# Patient Record
Sex: MALE | Race: WHITE | NOT HISPANIC OR LATINO | Employment: OTHER | URBAN - NONMETROPOLITAN AREA
[De-identification: names, ages, dates, MRNs, and addresses within clinical notes are randomized per-mention and may not be internally consistent; named-entity substitution may affect disease eponyms.]

---

## 2021-03-03 DIAGNOSIS — Z23 NEED FOR VACCINATION: ICD-10-CM

## 2022-01-14 ENCOUNTER — OFFICE VISIT (OUTPATIENT)
Dept: URGENT CARE | Facility: PHYSICIAN GROUP | Age: 67
End: 2022-01-14
Payer: MEDICARE

## 2022-01-14 VITALS
RESPIRATION RATE: 16 BRPM | TEMPERATURE: 98 F | SYSTOLIC BLOOD PRESSURE: 150 MMHG | OXYGEN SATURATION: 97 % | WEIGHT: 238.2 LBS | DIASTOLIC BLOOD PRESSURE: 96 MMHG | HEIGHT: 73 IN | HEART RATE: 134 BPM | BODY MASS INDEX: 31.57 KG/M2

## 2022-01-14 DIAGNOSIS — J01.00 ACUTE NON-RECURRENT MAXILLARY SINUSITIS: ICD-10-CM

## 2022-01-14 PROCEDURE — 99203 OFFICE O/P NEW LOW 30 MIN: CPT | Performed by: NURSE PRACTITIONER

## 2022-01-14 RX ORDER — AMOXICILLIN AND CLAVULANATE POTASSIUM 875; 125 MG/1; MG/1
1 TABLET, FILM COATED ORAL 2 TIMES DAILY
Qty: 10 TABLET | Refills: 0 | Status: SHIPPED | OUTPATIENT
Start: 2022-01-14 | End: 2022-01-19

## 2022-01-14 ASSESSMENT — ENCOUNTER SYMPTOMS
EYE PAIN: 0
FEVER: 0
SINUS PAIN: 1
CHILLS: 0
MYALGIAS: 0
NAUSEA: 0
SORE THROAT: 0
DIZZINESS: 0
VOMITING: 0
SHORTNESS OF BREATH: 0

## 2022-01-14 NOTE — PROGRESS NOTES
Subjective:   Bryan Onofre is a 66 y.o. male who presents for Sinusitis (X 7-10 days) and Headache (pressure under his eyes)      Sinusitis  This is a new problem. The current episode started 1 to 4 weeks ago. The problem is unchanged. His pain is at a severity of 3/10. The pain is mild. Associated symptoms include congestion. Pertinent negatives include no chills, shortness of breath or sore throat. Past treatments include acetaminophen. The treatment provided no relief.       Review of Systems   Constitutional: Negative for chills and fever.   HENT: Positive for congestion and sinus pain. Negative for sore throat.    Eyes: Negative for pain.   Respiratory: Negative for shortness of breath.    Cardiovascular: Negative for chest pain.   Gastrointestinal: Negative for nausea and vomiting.   Genitourinary: Negative for hematuria.   Musculoskeletal: Negative for myalgias.   Skin: Negative for rash.   Neurological: Negative for dizziness.       Medications:    • albuterol Aers  • amoxicillin-clavulanate Tabs  • aspirin  • atenolol Tabs  • glyBURIDE Tabs  • hydroCHLOROthiazide Tabs  • lansoprazole Cpdr  • levothyroxine Tabs  • metFORMIN Tabs    Allergies: Nkda [no known drug allergy]    Problem List: Bryan Onofre does not have any pertinent problems on file.    Surgical History:  Past Surgical History:   Procedure Laterality Date   • ARELI BY LAPAROSCOPY  12/13/2012    Performed by Stephanie Dennis M.D. at SURGERY [facility]   • KNEE ARTHROPLASTY TOTAL  1/26/2010    Performed by ANGELIC FLORES at SURGERY [facility]   • KNEE ARTHROSCOPY  2005    right   • OTHER      esophogeal ablation x3       Past Social Hx: Bryan Onofre  reports that he has been smoking cigarettes. He has a 60.00 pack-year smoking history. He has never used smokeless tobacco. He reports that he does not drink alcohol and does not use drugs.     Past Family Hx:  Bryan Onofre family history includes Arthritis in his  "mother; Cancer in his brother, father, and mother.     Problem list, medications, and allergies reviewed by myself today in Epic.     Objective:     /96   Pulse (!) 134   Temp 36.7 °C (98 °F) (Temporal)   Resp 16   Ht 1.854 m (6' 1\")   Wt 108 kg (238 lb 3.2 oz)   SpO2 97%   BMI 31.43 kg/m²     Physical Exam  Vitals and nursing note reviewed.   Constitutional:       General: He is not in acute distress.     Appearance: He is well-developed.   HENT:      Head: Normocephalic and atraumatic.      Right Ear: Tympanic membrane and external ear normal.      Left Ear: Tympanic membrane and external ear normal.      Nose: Nose normal.      Right Sinus: No maxillary sinus tenderness or frontal sinus tenderness.      Left Sinus: No maxillary sinus tenderness or frontal sinus tenderness.      Mouth/Throat:      Mouth: Mucous membranes are moist.      Pharynx: Uvula midline. No posterior oropharyngeal erythema.      Tonsils: No tonsillar exudate or tonsillar abscesses.   Eyes:      General:         Right eye: No discharge.         Left eye: No discharge.      Conjunctiva/sclera: Conjunctivae normal.   Cardiovascular:      Rate and Rhythm: Normal rate.   Pulmonary:      Effort: Pulmonary effort is normal. No respiratory distress.      Breath sounds: Normal breath sounds.   Abdominal:      General: There is no distension.   Musculoskeletal:         General: Normal range of motion.   Skin:     General: Skin is warm and dry.   Neurological:      General: No focal deficit present.      Mental Status: He is alert and oriented to person, place, and time. Mental status is at baseline.      Gait: Gait (gait at baseline) normal.   Psychiatric:         Judgment: Judgment normal.         Assessment/Plan:     Diagnosis and associated orders:     1. Acute non-recurrent maxillary sinusitis  amoxicillin-clavulanate (AUGMENTIN) 875-125 MG Tab      Comments/MDM:     • Advised to continue supportive care with Tylenol and/or ibuprofen " for fevers and discomfort. Increased fluids and electrolytes. Declining COVID testing  • Differential diagnosis, natural history, supportive care, and indications for immediate follow-up discussed.                  Please note that this dictation was created using voice recognition software. I have made a reasonable attempt to correct obvious errors, but I expect that there are errors of grammar and possibly content that I did not discover before finalizing the note.    This note was electronically signed by Vernon BAE.

## 2022-02-02 ENCOUNTER — OFFICE VISIT (OUTPATIENT)
Dept: URGENT CARE | Facility: PHYSICIAN GROUP | Age: 67
End: 2022-02-02
Payer: MEDICARE

## 2022-02-02 VITALS
WEIGHT: 245.8 LBS | SYSTOLIC BLOOD PRESSURE: 148 MMHG | HEIGHT: 73 IN | OXYGEN SATURATION: 99 % | HEART RATE: 80 BPM | BODY MASS INDEX: 32.58 KG/M2 | RESPIRATION RATE: 18 BRPM | TEMPERATURE: 97.2 F | DIASTOLIC BLOOD PRESSURE: 88 MMHG

## 2022-02-02 DIAGNOSIS — J34.89 SINUS PAIN: ICD-10-CM

## 2022-02-02 DIAGNOSIS — J32.9 SINUSITIS, UNSPECIFIED CHRONICITY, UNSPECIFIED LOCATION: ICD-10-CM

## 2022-02-02 PROCEDURE — 99213 OFFICE O/P EST LOW 20 MIN: CPT | Performed by: FAMILY MEDICINE

## 2022-02-02 RX ORDER — AMOXICILLIN AND CLAVULANATE POTASSIUM 875; 125 MG/1; MG/1
1 TABLET, FILM COATED ORAL 2 TIMES DAILY
Qty: 14 TABLET | Refills: 0 | Status: SHIPPED | OUTPATIENT
Start: 2022-02-02 | End: 2022-02-09

## 2022-02-02 ASSESSMENT — ENCOUNTER SYMPTOMS
SHORTNESS OF BREATH: 0
COUGH: 0
DIZZINESS: 0
SINUS PRESSURE: 1
FEVER: 0
SORE THROAT: 0
NAUSEA: 0
VOMITING: 0
CHILLS: 0
MYALGIAS: 0
SINUS PAIN: 1

## 2022-02-02 NOTE — PATIENT INSTRUCTIONS
Sinusitis, Adult  Sinusitis is soreness and swelling (inflammation) of your sinuses. Sinuses are hollow spaces in the bones around your face. They are located:  · Around your eyes.  · In the middle of your forehead.  · Behind your nose.  · In your cheekbones.  Your sinuses and nasal passages are lined with a fluid called mucus. Mucus drains out of your sinuses. Swelling can trap mucus in your sinuses. This lets germs (bacteria, virus, or fungus) grow, which leads to infection. Most of the time, this condition is caused by a virus.  What are the causes?  This condition is caused by:  · Allergies.  · Asthma.  · Germs.  · Things that block your nose or sinuses.  · Growths in the nose (nasal polyps).  · Chemicals or irritants in the air.  · Fungus (rare).  What increases the risk?  You are more likely to develop this condition if:  · You have a weak body defense system (immune system).  · You do a lot of swimming or diving.  · You use nasal sprays too much.  · You smoke.  What are the signs or symptoms?  The main symptoms of this condition are pain and a feeling of pressure around the sinuses. Other symptoms include:  · Stuffy nose (congestion).  · Runny nose (drainage).  · Swelling and warmth in the sinuses.  · Headache.  · Toothache.  · A cough that may get worse at night.  · Mucus that collects in the throat or the back of the nose (postnasal drip).  · Being unable to smell and taste.  · Being very tired (fatigue).  · A fever.  · Sore throat.  · Bad breath.  How is this diagnosed?  This condition is diagnosed based on:  · Your symptoms.  · Your medical history.  · A physical exam.  · Tests to find out if your condition is short-term (acute) or long-term (chronic). Your doctor may:  ? Check your nose for growths (polyps).  ? Check your sinuses using a tool that has a light (endoscope).  ? Check for allergies or germs.  ? Do imaging tests, such as an MRI or CT scan.  How is this treated?  Treatment for this condition  depends on the cause and whether it is short-term or long-term.  · If caused by a virus, your symptoms should go away on their own within 10 days. You may be given medicines to relieve symptoms. They include:  ? Medicines that shrink swollen tissue in the nose.  ? Medicines that treat allergies (antihistamines).  ? A spray that treats swelling of the nostrils.   ? Rinses that help get rid of thick mucus in your nose (nasal saline washes).  · If caused by bacteria, your doctor may wait to see if you will get better without treatment. You may be given antibiotic medicine if you have:  ? A very bad infection.  ? A weak body defense system.  · If caused by growths in the nose, you may need to have surgery.  Follow these instructions at home:  Medicines  · Take, use, or apply over-the-counter and prescription medicines only as told by your doctor. These may include nasal sprays.  · If you were prescribed an antibiotic medicine, take it as told by your doctor. Do not stop taking the antibiotic even if you start to feel better.  Hydrate and humidify    · Drink enough water to keep your pee (urine) pale yellow.  · Use a cool mist humidifier to keep the humidity level in your home above 50%.  · Breathe in steam for 10-15 minutes, 3-4 times a day, or as told by your doctor. You can do this in the bathroom while a hot shower is running.  · Try not to spend time in cool or dry air.  Rest  · Rest as much as you can.  · Sleep with your head raised (elevated).  · Make sure you get enough sleep each night.  General instructions    · Put a warm, moist washcloth on your face 3-4 times a day, or as often as told by your doctor. This will help with discomfort.  · Wash your hands often with soap and water. If there is no soap and water, use hand .  · Do not smoke. Avoid being around people who are smoking (secondhand smoke).  · Keep all follow-up visits as told by your doctor. This is important.  Contact a doctor if:  · You  have a fever.  · Your symptoms get worse.  · Your symptoms do not get better within 10 days.  Get help right away if:  · You have a very bad headache.  · You cannot stop throwing up (vomiting).  · You have very bad pain or swelling around your face or eyes.  · You have trouble seeing.  · You feel confused.  · Your neck is stiff.  · You have trouble breathing.  Summary  · Sinusitis is swelling of your sinuses. Sinuses are hollow spaces in the bones around your face.  · This condition is caused by tissues in your nose that become inflamed or swollen. This traps germs. These can lead to infection.  · If you were prescribed an antibiotic medicine, take it as told by your doctor. Do not stop taking it even if you start to feel better.  · Keep all follow-up visits as told by your doctor. This is important.  This information is not intended to replace advice given to you by your health care provider. Make sure you discuss any questions you have with your health care provider.  Document Released: 06/05/2009 Document Revised: 05/20/2019 Document Reviewed: 05/20/2019  [employer] Patient Education © 2020 [employer]

## 2022-02-02 NOTE — PROGRESS NOTES
Subjective:   Bryan Onofre is a 66 y.o. male who presents for Other (sinus infection x 1 m.o )        Sinusitis  This is a recurrent (Complains of sinus pain and pressure over the past month) problem. The problem is unchanged. Associated symptoms include congestion and sinus pressure. Pertinent negatives include no chills, coughing, shortness of breath or sore throat. (Initial improvement after completing antibiotics and symptoms recurred) Treatments tried: Recent antibiotics. The treatment provided mild relief.     PMH:  has a past medical history of Anesthesia, Arthritis, Vallecillo esophagus, Breath shortness, COPD, Diabetes (12/30/09), Diabetic neuropathy (HCC), Elevated LFT's, GERD (gastroesophageal reflux disease), Heart burn, Hiatus hernia syndrome, Hyperlipidemia, Hypertension, Indigestion, Pain, Sleep apnea, and Unspecified disorder of thyroid.  MEDS:   Current Outpatient Medications:   •  amoxicillin-clavulanate (AUGMENTIN) 875-125 MG Tab, Take 1 Tablet by mouth 2 times a day for 7 days., Disp: 14 Tablet, Rfl: 0  •  lansoprazole (PREVACID) 30 MG CAPSULE DELAYED RELEASE, Take 30 mg by mouth 2 times a day., Disp: , Rfl:   •  levothyroxine (SYNTHROID) 50 MCG TABS, Take 75 mcg by mouth every day., Disp: , Rfl:   •  glyBURIDE (DIABETA) 5 MG TABS, Take 5 mg by mouth 2 Times a Day., Disp: , Rfl:   •  hydrochlorothiazide (HYDRODIURIL) 25 MG TABS, Take 25 mg by mouth every day., Disp: , Rfl:   •  atenolol (TENORMIN) 25 MG TABS, Take 25 mg by mouth every day.  , Disp: , Rfl:   •  metformin (GLUCOPHAGE) 500 MG TABS, Take 500 mg by mouth 3 times a day, with meals., Disp: , Rfl:   •  aspirin 81 MG tablet, Take 81 mg by mouth every day., Disp: , Rfl:   •  albuterol (PROVENTIL) 90 MCG/ACT AERS, Inhale 2 Puffs by mouth every 6 hours as needed for Shortness of Breath., Disp: , Rfl:   ALLERGIES:   Allergies   Allergen Reactions   • Nkda [No Known Drug Allergy]      SURGHX:   Past Surgical History:   Procedure Laterality  "Date   • ARELI BY LAPAROSCOPY  12/13/2012    Performed by Stephanie Dennis M.D. at SURGERY [facility]   • KNEE ARTHROPLASTY TOTAL  1/26/2010    Performed by ANGELIC FLORES at SURGERY [facility]   • KNEE ARTHROSCOPY  2005    right   • OTHER      esophogeal ablation x3     SOCHX:  reports that he has been smoking cigarettes. He has a 60.00 pack-year smoking history. He has never used smokeless tobacco. He reports that he does not drink alcohol and does not use drugs.  FH:   Family History   Problem Relation Age of Onset   • Cancer Mother         colon    • Arthritis Mother    • Cancer Father         colon    • Cancer Brother         colon      Review of Systems   Constitutional: Negative for chills and fever.   HENT: Positive for congestion, sinus pressure and sinus pain. Negative for sore throat.    Respiratory: Negative for cough and shortness of breath.    Gastrointestinal: Negative for nausea and vomiting.   Musculoskeletal: Negative for myalgias.   Skin: Negative for rash.   Neurological: Negative for dizziness.        Objective:   /88   Pulse 80   Temp 36.2 °C (97.2 °F) (Temporal)   Resp 18   Ht 1.854 m (6' 1\")   Wt 111 kg (245 lb 12.8 oz)   SpO2 99%   BMI 32.43 kg/m²   Physical Exam  Vitals and nursing note reviewed.   Constitutional:       General: He is not in acute distress.     Appearance: He is well-developed.   HENT:      Head: Normocephalic and atraumatic.      Right Ear: External ear normal.      Left Ear: External ear normal.      Nose: Mucosal edema present.      Right Turbinates: Swollen.      Left Turbinates: Swollen.      Comments: Turbinates edematous, purulent exudate noted     Mouth/Throat:      Mouth: Mucous membranes are moist.      Pharynx: Posterior oropharyngeal erythema (posterior pharyngeal drainage and erythema) present.   Eyes:      Conjunctiva/sclera: Conjunctivae normal.   Cardiovascular:      Rate and Rhythm: Normal rate.   Pulmonary:      Effort: Pulmonary " effort is normal. No respiratory distress.      Breath sounds: Normal breath sounds. No wheezing or rhonchi.   Abdominal:      General: There is no distension.   Musculoskeletal:         General: Normal range of motion.   Skin:     General: Skin is warm and dry.   Neurological:      General: No focal deficit present.      Mental Status: He is alert and oriented to person, place, and time. Mental status is at baseline.      Gait: Gait (gait at baseline) normal.   Psychiatric:         Judgment: Judgment normal.           Assessment/Plan:   1. Sinusitis, unspecified chronicity, unspecified location  - amoxicillin-clavulanate (AUGMENTIN) 875-125 MG Tab; Take 1 Tablet by mouth 2 times a day for 7 days.  Dispense: 14 Tablet; Refill: 0    2. Sinus pain  - amoxicillin-clavulanate (AUGMENTIN) 875-125 MG Tab; Take 1 Tablet by mouth 2 times a day for 7 days.  Dispense: 14 Tablet; Refill: 0        Medical Decision Making/Course:  In the course of preparing for this visit with review of the pertinent past medical history, recent and past clinic visits, current medications, and performing chart, immunization, medical history and medication reconciliation, and in the further course of obtaining the current history pertinent to the clinic visit today, performing an exam and evaluation, ordering and independently evaluating labs, tests  , and/or procedures, prescribing any recommended new medications as noted above, providing any pertinent counseling and education and recommending further coordination of care, at least  15 minutes of total time were spent during this encounter.      Discussed close monitoring, return precautions, and supportive measures of maintaining adequate fluid hydration and caloric intake, relative rest and symptom management as needed for pain and/or fever.    Differential diagnosis, natural history, supportive care, and indications for immediate follow-up discussed.     Advised the patient to follow-up with  the primary care physician for recheck, reevaluation, and consideration of further management.    Please note that this dictation was created using voice recognition software. I have worked with consultants from the vendor as well as technical experts from [facility] [employer] to optimize the interface. I have made every reasonable attempt to correct obvious errors, but I expect that there are errors of grammar and possibly content that I did not discover before finalizing the note.

## 2022-06-20 ENCOUNTER — TELEPHONE (OUTPATIENT)
Dept: SCHEDULING | Facility: IMAGING CENTER | Age: 67
End: 2022-06-20

## 2022-08-18 ENCOUNTER — OFFICE VISIT (OUTPATIENT)
Dept: MEDICAL GROUP | Facility: PHYSICIAN GROUP | Age: 67
End: 2022-08-18
Payer: MEDICARE

## 2022-08-18 ENCOUNTER — HOSPITAL ENCOUNTER (OUTPATIENT)
Dept: LAB | Facility: MEDICAL CENTER | Age: 67
End: 2022-08-18
Attending: FAMILY MEDICINE
Payer: MEDICARE

## 2022-08-18 VITALS
SYSTOLIC BLOOD PRESSURE: 120 MMHG | HEART RATE: 65 BPM | TEMPERATURE: 97.4 F | BODY MASS INDEX: 29.95 KG/M2 | OXYGEN SATURATION: 95 % | HEIGHT: 73 IN | RESPIRATION RATE: 18 BRPM | WEIGHT: 226 LBS | DIASTOLIC BLOOD PRESSURE: 78 MMHG

## 2022-08-18 DIAGNOSIS — E55.9 VITAMIN D DEFICIENCY: ICD-10-CM

## 2022-08-18 DIAGNOSIS — Z12.5 SCREENING FOR MALIGNANT NEOPLASM OF PROSTATE: ICD-10-CM

## 2022-08-18 DIAGNOSIS — E03.9 HYPOTHYROIDISM, UNSPECIFIED TYPE: ICD-10-CM

## 2022-08-18 DIAGNOSIS — R53.82 CHRONIC FATIGUE: ICD-10-CM

## 2022-08-18 DIAGNOSIS — E11.65 UNCONTROLLED TYPE 2 DIABETES MELLITUS WITH HYPERGLYCEMIA (HCC): ICD-10-CM

## 2022-08-18 LAB
ALBUMIN SERPL BCP-MCNC: 4.8 G/DL (ref 3.2–4.9)
ALBUMIN/GLOB SERPL: 1.5 G/DL
ALP SERPL-CCNC: 65 U/L (ref 30–99)
ALT SERPL-CCNC: 11 U/L (ref 2–50)
ANION GAP SERPL CALC-SCNC: 16 MMOL/L (ref 7–16)
AST SERPL-CCNC: 14 U/L (ref 12–45)
BASOPHILS # BLD AUTO: 1.8 % (ref 0–1.8)
BASOPHILS # BLD: 0.1 K/UL (ref 0–0.12)
BILIRUB SERPL-MCNC: 0.6 MG/DL (ref 0.1–1.5)
BUN SERPL-MCNC: 22 MG/DL (ref 8–22)
CALCIUM SERPL-MCNC: 9.6 MG/DL (ref 8.5–10.5)
CHLORIDE SERPL-SCNC: 93 MMOL/L (ref 96–112)
CHOLEST SERPL-MCNC: 252 MG/DL (ref 100–199)
CO2 SERPL-SCNC: 22 MMOL/L (ref 20–33)
CREAT SERPL-MCNC: 0.97 MG/DL (ref 0.5–1.4)
EOSINOPHIL # BLD AUTO: 0.08 K/UL (ref 0–0.51)
EOSINOPHIL NFR BLD: 1.4 % (ref 0–6.9)
ERYTHROCYTE [DISTWIDTH] IN BLOOD BY AUTOMATED COUNT: 42.1 FL (ref 35.9–50)
EST. AVERAGE GLUCOSE BLD GHB EST-MCNC: 341 MG/DL
FASTING STATUS PATIENT QL REPORTED: NORMAL
GFR SERPLBLD CREATININE-BSD FMLA CKD-EPI: 84 ML/MIN/1.73 M 2
GLOBULIN SER CALC-MCNC: 3.3 G/DL (ref 1.9–3.5)
GLUCOSE SERPL-MCNC: 392 MG/DL (ref 65–99)
HBA1C MFR BLD: 13.5 % (ref 4–5.6)
HCT VFR BLD AUTO: 44 % (ref 42–52)
HDLC SERPL-MCNC: 28 MG/DL
HGB BLD-MCNC: 15.7 G/DL (ref 14–18)
IMM GRANULOCYTES # BLD AUTO: 0.03 K/UL (ref 0–0.11)
IMM GRANULOCYTES NFR BLD AUTO: 0.5 % (ref 0–0.9)
LDLC SERPL CALC-MCNC: ABNORMAL MG/DL
LYMPHOCYTES # BLD AUTO: 2.34 K/UL (ref 1–4.8)
LYMPHOCYTES NFR BLD: 41.6 % (ref 22–41)
MCH RBC QN AUTO: 34.1 PG (ref 27–33)
MCHC RBC AUTO-ENTMCNC: 35.7 G/DL (ref 33.7–35.3)
MCV RBC AUTO: 95.7 FL (ref 81.4–97.8)
MONOCYTES # BLD AUTO: 0.36 K/UL (ref 0–0.85)
MONOCYTES NFR BLD AUTO: 6.4 % (ref 0–13.4)
NEUTROPHILS # BLD AUTO: 2.72 K/UL (ref 1.82–7.42)
NEUTROPHILS NFR BLD: 48.3 % (ref 44–72)
NRBC # BLD AUTO: 0 K/UL
NRBC BLD-RTO: 0 /100 WBC
PLATELET # BLD AUTO: 243 K/UL (ref 164–446)
PMV BLD AUTO: 11.4 FL (ref 9–12.9)
POTASSIUM SERPL-SCNC: 4.4 MMOL/L (ref 3.6–5.5)
PROT SERPL-MCNC: 8.1 G/DL (ref 6–8.2)
RBC # BLD AUTO: 4.6 M/UL (ref 4.7–6.1)
SODIUM SERPL-SCNC: 131 MMOL/L (ref 135–145)
TRIGL SERPL-MCNC: 448 MG/DL (ref 0–149)
WBC # BLD AUTO: 5.6 K/UL (ref 4.8–10.8)

## 2022-08-18 PROCEDURE — 85025 COMPLETE CBC W/AUTO DIFF WBC: CPT

## 2022-08-18 PROCEDURE — 99214 OFFICE O/P EST MOD 30 MIN: CPT | Performed by: FAMILY MEDICINE

## 2022-08-18 PROCEDURE — 82570 ASSAY OF URINE CREATININE: CPT

## 2022-08-18 PROCEDURE — 84153 ASSAY OF PSA TOTAL: CPT

## 2022-08-18 PROCEDURE — 83036 HEMOGLOBIN GLYCOSYLATED A1C: CPT

## 2022-08-18 PROCEDURE — 36415 COLL VENOUS BLD VENIPUNCTURE: CPT

## 2022-08-18 PROCEDURE — 80053 COMPREHEN METABOLIC PANEL: CPT

## 2022-08-18 PROCEDURE — 82306 VITAMIN D 25 HYDROXY: CPT

## 2022-08-18 PROCEDURE — 82043 UR ALBUMIN QUANTITATIVE: CPT

## 2022-08-18 PROCEDURE — 84443 ASSAY THYROID STIM HORMONE: CPT

## 2022-08-18 PROCEDURE — 80061 LIPID PANEL: CPT

## 2022-08-18 RX ORDER — DULOXETIN HYDROCHLORIDE 60 MG/1
60 CAPSULE, DELAYED RELEASE ORAL DAILY
Qty: 90 CAPSULE | Refills: 3 | Status: SHIPPED | OUTPATIENT
Start: 2022-08-18 | End: 2023-06-01 | Stop reason: SDUPTHER

## 2022-08-18 RX ORDER — TAMSULOSIN HYDROCHLORIDE 0.4 MG/1
0.4 CAPSULE ORAL
Qty: 90 CAPSULE | Refills: 3 | Status: SHIPPED | OUTPATIENT
Start: 2022-08-18 | End: 2023-06-01 | Stop reason: SDUPTHER

## 2022-08-18 RX ORDER — HYDROCHLOROTHIAZIDE 25 MG/1
25 TABLET ORAL DAILY
Qty: 90 TABLET | Refills: 3 | Status: SHIPPED | OUTPATIENT
Start: 2022-08-18 | End: 2023-06-01 | Stop reason: SDUPTHER

## 2022-08-18 RX ORDER — BUDESONIDE AND FORMOTEROL FUMARATE DIHYDRATE 80; 4.5 UG/1; UG/1
2 AEROSOL RESPIRATORY (INHALATION) 2 TIMES DAILY
COMMUNITY
End: 2022-08-18 | Stop reason: SDUPTHER

## 2022-08-18 RX ORDER — OMEPRAZOLE 20 MG/1
40 CAPSULE, DELAYED RELEASE ORAL DAILY
Qty: 180 CAPSULE | Refills: 3 | Status: SHIPPED | OUTPATIENT
Start: 2022-08-18 | End: 2023-06-01 | Stop reason: SDUPTHER

## 2022-08-18 RX ORDER — INSULIN GLARGINE 100 [IU]/ML
10 INJECTION, SOLUTION SUBCUTANEOUS EVERY EVENING
Qty: 9 ML | Refills: 0 | Status: SHIPPED | OUTPATIENT
Start: 2022-08-18 | End: 2022-11-08 | Stop reason: SDUPTHER

## 2022-08-18 RX ORDER — LEVOTHYROXINE SODIUM 0.05 MG/1
75 TABLET ORAL DAILY
Qty: 135 TABLET | Refills: 3 | Status: SHIPPED | OUTPATIENT
Start: 2022-08-18 | End: 2022-09-22

## 2022-08-18 RX ORDER — DULOXETIN HYDROCHLORIDE 20 MG/1
20 CAPSULE, DELAYED RELEASE ORAL DAILY
COMMUNITY
End: 2022-08-18

## 2022-08-18 RX ORDER — OMEPRAZOLE 20 MG/1
40 CAPSULE, DELAYED RELEASE ORAL DAILY
COMMUNITY
End: 2022-08-18 | Stop reason: SDUPTHER

## 2022-08-18 RX ORDER — TAMSULOSIN HYDROCHLORIDE 0.4 MG/1
0.4 CAPSULE ORAL
COMMUNITY
End: 2022-08-18 | Stop reason: SDUPTHER

## 2022-08-18 RX ORDER — ATENOLOL 25 MG/1
25 TABLET ORAL DAILY
Qty: 90 TABLET | Refills: 3 | Status: SHIPPED | OUTPATIENT
Start: 2022-08-18 | End: 2023-06-01 | Stop reason: SDUPTHER

## 2022-08-18 RX ORDER — GEMFIBROZIL 600 MG/1
600 TABLET, FILM COATED ORAL 2 TIMES DAILY
COMMUNITY
End: 2022-08-18 | Stop reason: SDUPTHER

## 2022-08-18 RX ORDER — BUDESONIDE AND FORMOTEROL FUMARATE DIHYDRATE 80; 4.5 UG/1; UG/1
2 AEROSOL RESPIRATORY (INHALATION) 2 TIMES DAILY
Qty: 30.6 G | Refills: 3 | Status: SHIPPED | OUTPATIENT
Start: 2022-08-18 | End: 2023-06-01 | Stop reason: SDUPTHER

## 2022-08-18 RX ORDER — ASPIRIN 81 MG/1
81 TABLET, CHEWABLE ORAL DAILY
Qty: 100 TABLET | Refills: 3 | Status: SHIPPED | OUTPATIENT
Start: 2022-08-18 | End: 2023-06-01 | Stop reason: SDUPTHER

## 2022-08-18 RX ORDER — GEMFIBROZIL 600 MG/1
600 TABLET, FILM COATED ORAL 2 TIMES DAILY
Qty: 180 TABLET | Refills: 3 | Status: SHIPPED | OUTPATIENT
Start: 2022-08-18 | End: 2022-09-22

## 2022-08-18 ASSESSMENT — PATIENT HEALTH QUESTIONNAIRE - PHQ9
CLINICAL INTERPRETATION OF PHQ2 SCORE: 1
SUM OF ALL RESPONSES TO PHQ QUESTIONS 1-9: 11
5. POOR APPETITE OR OVEREATING: 3 - NEARLY EVERY DAY

## 2022-08-18 NOTE — PROGRESS NOTES
Subjective:   Bryan Onofre is a 67 y.o. male here today for evaluation and management of:     Uncontrolled type 2 diabetes mellitus with hyperglycemia (HCC)  Notes A1c is about 12  Will check POCT  On met, sulfonylurea. Will dc sulfonylurea  He was on januvia had side effects  Did well on victoza in the past with improved blood sugars but due to insurance issues, had to stop  Will provide Rx for lantus and victoza.   Blood sugars night 90s then up to 200s in the morning   He notes he can get A1c down to 7 if he just is on water, broth and popcorn but would like to eat some other foods so recently not been following a diabetic diet. Diet printed for him.   Fasting labs ordered.   Follow up to review labs, retinal etc.     All meds refilled.        Current medicines (including changes today)  Current Outpatient Medications   Medication Sig Dispense Refill    aspirin (ASA) 81 MG Chew Tab chewable tablet Chew 1 Tablet every day. 100 Tablet 3    atenolol (TENORMIN) 25 MG Tab Take 1 Tablet by mouth every day. For high blood pressure 90 Tablet 3    budesonide-formoterol (SYMBICORT) 80-4.5 MCG/ACT Aerosol Inhale 2 Puffs 2 times a day. 30.6 g 3    DULoxetine (CYMBALTA) 60 MG Cap DR Particles delayed-release capsule Take 1 Capsule by mouth every day. For neuropathy pain 90 Capsule 3    gemfibrozil (LOPID) 600 MG Tab Take 1 Tablet by mouth 2 times a day. For high cholesterol 180 Tablet 3    hydroCHLOROthiazide (HYDRODIURIL) 25 MG Tab Take 1 Tablet by mouth every day. For high blood pressure 90 Tablet 3    levothyroxine (SYNTHROID) 50 MCG Tab Take 1.5 Tablets by mouth every day. For hypothyroid 135 Tablet 3    metFORMIN (GLUCOPHAGE) 500 MG Tab Take 1 Tablet by mouth 3 times a day with meals. 270 Tablet 3    omeprazole (PRILOSEC) 20 MG delayed-release capsule Take 2 Capsules by mouth every day. For Vallecillo's esophagus 180 Capsule 3    tamsulosin (FLOMAX) 0.4 MG capsule Take 1 Capsule by mouth 1/2 hour after dinner. For  "prostate 90 Capsule 3    insulin glargine (LANTUS SOLOSTAR) 100 UNIT/ML Solution Pen-injector injection Inject 10 Units under the skin every evening for 90 days. 9 mL 0    liraglutide (VICTOZA) 18 MG/3ML Solution Pen-injector Inject 0.6 mg under the skin every day for 90 days. For diabetes 9 mL 3    albuterol (PROVENTIL) 90 MCG/ACT Aero Soln Inhale 2 Puffs by mouth every 6 hours as needed for Shortness of Breath. (Patient not taking: Reported on 8/18/2022)       No current facility-administered medications for this visit.     He  has a past medical history of Anesthesia, Arthritis, Vallecillo esophagus, Breath shortness, COPD, Diabetes (12/30/09), Diabetic neuropathy (HCC), Elevated LFT's, GERD (gastroesophageal reflux disease), Heart burn, Hiatus hernia syndrome, Hyperlipidemia, Hypertension, Indigestion, Pain, Sleep apnea, and Unspecified disorder of thyroid.    ROS  No chest pain, no shortness of breath, no abdominal pain       Objective:     /78 (BP Location: Left arm, Patient Position: Sitting, BP Cuff Size: Large adult)   Pulse 65   Temp 36.3 °C (97.4 °F)   Resp 18   Ht 1.854 m (6' 1\")   Wt 103 kg (226 lb)   SpO2 95%  Body mass index is 29.82 kg/m².   Physical Exam:  Constitutional: Alert, no distress.  Skin: Warm, dry, good turgor, no rashes in visible areas.  Eye: Equal, round and reactive, conjunctiva clear, lids normal.  ENMT: Lips without lesions, good dentition, oropharynx clear.  Neck: Trachea midline, no masses, no thyromegaly. No cervical or supraclavicular lymphadenopathy  Respiratory: Unlabored respiratory effort, lungs clear to auscultation, no wheezes, no ronchi.  Cardiovascular: Normal S1, S2, no murmur, no edema.  Abdomen: Soft, non-tender, no masses, no hepatosplenomegaly.  Psych: Alert and oriented x3, normal affect and mood.        Assessment and Plan:   The following treatment plan was discussed    1. Uncontrolled type 2 diabetes mellitus with hyperglycemia (HCC)  - HEMOGLOBIN A1C; " Future  - Comp Metabolic Panel; Future  - Lipid Profile; Future  - MICROALBUMIN CREAT RATIO URINE; Future    2. Chronic fatigue  - CBC WITH DIFFERENTIAL; Future    3. Hypothyroidism, unspecified type  - TSH WITH REFLEX TO FT4; Future    4. Vitamin D deficiency  - VITAMIN D,25 HYDROXY; Future    5. Screening for malignant neoplasm of prostate  - PROSTATE SPECIFIC AG SCREENING; Future    Other orders  - aspirin (ASA) 81 MG Chew Tab chewable tablet; Chew 1 Tablet every day.  Dispense: 100 Tablet; Refill: 3  - atenolol (TENORMIN) 25 MG Tab; Take 1 Tablet by mouth every day. For high blood pressure  Dispense: 90 Tablet; Refill: 3  - budesonide-formoterol (SYMBICORT) 80-4.5 MCG/ACT Aerosol; Inhale 2 Puffs 2 times a day.  Dispense: 30.6 g; Refill: 3  - DULoxetine (CYMBALTA) 60 MG Cap DR Particles delayed-release capsule; Take 1 Capsule by mouth every day. For neuropathy pain  Dispense: 90 Capsule; Refill: 3  - gemfibrozil (LOPID) 600 MG Tab; Take 1 Tablet by mouth 2 times a day. For high cholesterol  Dispense: 180 Tablet; Refill: 3  - hydroCHLOROthiazide (HYDRODIURIL) 25 MG Tab; Take 1 Tablet by mouth every day. For high blood pressure  Dispense: 90 Tablet; Refill: 3  - levothyroxine (SYNTHROID) 50 MCG Tab; Take 1.5 Tablets by mouth every day. For hypothyroid  Dispense: 135 Tablet; Refill: 3  - metFORMIN (GLUCOPHAGE) 500 MG Tab; Take 1 Tablet by mouth 3 times a day with meals.  Dispense: 270 Tablet; Refill: 3  - omeprazole (PRILOSEC) 20 MG delayed-release capsule; Take 2 Capsules by mouth every day. For Vallecillo's esophagus  Dispense: 180 Capsule; Refill: 3  - tamsulosin (FLOMAX) 0.4 MG capsule; Take 1 Capsule by mouth 1/2 hour after dinner. For prostate  Dispense: 90 Capsule; Refill: 3  - insulin glargine (LANTUS SOLOSTAR) 100 UNIT/ML Solution Pen-injector injection; Inject 10 Units under the skin every evening for 90 days.  Dispense: 9 mL; Refill: 0  - liraglutide (VICTOZA) 18 MG/3ML Solution Pen-injector; Inject 0.6 mg  under the skin every day for 90 days. For diabetes  Dispense: 9 mL; Refill: 3      Followup: Return in about 1 month (around 9/18/2022) for Lab Review, DM2, fall risk, depression, pls enter phq.

## 2022-08-18 NOTE — PATIENT INSTRUCTIONS
Please get fasting labs, fasting for 8-10 hours before next visit. You can make an appointment for the lab or walk in.   Lab hours [facility] location: Monday to Friday 6 am - 4 pm, Saturday 7 am -noon  Even if you lose your lab paperwork, you can still come in to get your lab done.

## 2022-08-18 NOTE — ASSESSMENT & PLAN NOTE
Notes A1c is about 12  Will check POCT  On met, sulfonylurea. Will dc sulfonylurea  He was on januvia had side effects  Did well on victoza in the past with improved blood sugars but due to insurance issues, had to stop  Will provide Rx for lantus and victoza.   Blood sugars night 90s then up to 200s in the morning   He notes he can get A1c down to 7 if he just is on water, broth and popcorn but would like to eat some other foods so recently not been following a diabetic diet. Diet printed for him.   Fasting labs ordered.   Follow up to review labs, retinal etc.     All meds refilled.

## 2022-08-19 LAB
25(OH)D3 SERPL-MCNC: 70 NG/ML (ref 30–100)
CREAT UR-MCNC: 73.57 MG/DL
MICROALBUMIN UR-MCNC: 1.5 MG/DL
MICROALBUMIN/CREAT UR: 20 MG/G (ref 0–30)
PSA SERPL-MCNC: 0.33 NG/ML (ref 0–4)
TSH SERPL DL<=0.005 MIU/L-ACNC: 2.09 UIU/ML (ref 0.38–5.33)

## 2022-09-07 ENCOUNTER — TELEPHONE (OUTPATIENT)
Dept: MEDICAL GROUP | Facility: PHYSICIAN GROUP | Age: 67
End: 2022-09-07
Payer: MEDICARE

## 2022-09-07 RX ORDER — SEMAGLUTIDE 1.34 MG/ML
0.25 INJECTION, SOLUTION SUBCUTANEOUS
Qty: 1.5 ML | Refills: 3 | Status: SHIPPED | OUTPATIENT
Start: 2022-09-07 | End: 2022-09-22

## 2022-09-07 NOTE — TELEPHONE ENCOUNTER
1. Caller Name: Bryan Onofre                          Call Back Number: [phone] (home) [phone] (work)        How would the patient prefer to be contacted with a response: Hunter read    spoke with the insurance co for the medication Victozza      They state that the patient need to have and adequate rial of any of the following medications    Bydureon, Byetta, ozempic trulicity  before they will consider victozza.

## 2022-09-22 ENCOUNTER — OFFICE VISIT (OUTPATIENT)
Dept: MEDICAL GROUP | Facility: PHYSICIAN GROUP | Age: 67
End: 2022-09-22
Payer: MEDICARE

## 2022-09-22 VITALS
BODY MASS INDEX: 28.63 KG/M2 | SYSTOLIC BLOOD PRESSURE: 126 MMHG | HEIGHT: 73 IN | TEMPERATURE: 97 F | DIASTOLIC BLOOD PRESSURE: 80 MMHG | HEART RATE: 68 BPM | OXYGEN SATURATION: 97 % | WEIGHT: 216 LBS | RESPIRATION RATE: 16 BRPM

## 2022-09-22 DIAGNOSIS — Z12.12 SCREENING FOR COLORECTAL CANCER: ICD-10-CM

## 2022-09-22 DIAGNOSIS — Z12.11 SCREENING FOR COLORECTAL CANCER: ICD-10-CM

## 2022-09-22 DIAGNOSIS — Z12.11 SCREENING FOR COLON CANCER: ICD-10-CM

## 2022-09-22 DIAGNOSIS — E78.5 HYPERLIPIDEMIA, UNSPECIFIED HYPERLIPIDEMIA TYPE: ICD-10-CM

## 2022-09-22 DIAGNOSIS — E11.65 UNCONTROLLED TYPE 2 DIABETES MELLITUS WITH HYPERGLYCEMIA (HCC): ICD-10-CM

## 2022-09-22 DIAGNOSIS — Z80.0 FAMILY HISTORY OF COLON CANCER IN MOTHER: ICD-10-CM

## 2022-09-22 DIAGNOSIS — E03.9 HYPOTHYROIDISM, UNSPECIFIED TYPE: ICD-10-CM

## 2022-09-22 PROCEDURE — 99214 OFFICE O/P EST MOD 30 MIN: CPT | Performed by: FAMILY MEDICINE

## 2022-09-22 RX ORDER — ROSUVASTATIN CALCIUM 10 MG/1
10 TABLET, COATED ORAL EVERY EVENING
Qty: 90 TABLET | Refills: 3 | Status: SHIPPED | OUTPATIENT
Start: 2022-09-22 | End: 2022-09-22 | Stop reason: SDUPTHER

## 2022-09-22 RX ORDER — GABAPENTIN 600 MG/1
TABLET ORAL
COMMUNITY
End: 2022-11-08

## 2022-09-22 RX ORDER — SEMAGLUTIDE 1.34 MG/ML
0.5 INJECTION, SOLUTION SUBCUTANEOUS
Qty: 3 ML | Refills: 0 | Status: SHIPPED | OUTPATIENT
Start: 2022-09-22 | End: 2022-11-08 | Stop reason: SDUPTHER

## 2022-09-22 RX ORDER — LIRAGLUTIDE 6 MG/ML
INJECTION SUBCUTANEOUS
COMMUNITY
End: 2022-09-22

## 2022-09-22 RX ORDER — FENOFIBRATE 145 MG/1
145 TABLET, COATED ORAL DAILY
Qty: 90 TABLET | Refills: 3 | Status: SHIPPED | OUTPATIENT
Start: 2022-09-22 | End: 2022-09-22 | Stop reason: SDUPTHER

## 2022-09-22 RX ORDER — LOSARTAN POTASSIUM 25 MG/1
25 TABLET ORAL DAILY
Qty: 90 TABLET | Refills: 3 | Status: SHIPPED | OUTPATIENT
Start: 2022-09-22 | End: 2022-09-22 | Stop reason: SDUPTHER

## 2022-09-22 RX ORDER — LOSARTAN POTASSIUM 25 MG/1
25 TABLET ORAL DAILY
Qty: 90 TABLET | Refills: 3 | Status: SHIPPED | OUTPATIENT
Start: 2022-09-22 | End: 2023-06-01 | Stop reason: SDUPTHER

## 2022-09-22 RX ORDER — LEVOTHYROXINE SODIUM 0.12 MG/1
125 TABLET ORAL
Qty: 90 TABLET | Refills: 3 | Status: SHIPPED | OUTPATIENT
Start: 2022-09-22 | End: 2023-06-01 | Stop reason: SDUPTHER

## 2022-09-22 RX ORDER — LEVOTHYROXINE SODIUM 0.12 MG/1
125 TABLET ORAL
Qty: 90 TABLET | Refills: 3 | Status: SHIPPED | OUTPATIENT
Start: 2022-09-22 | End: 2022-09-22 | Stop reason: SDUPTHER

## 2022-09-22 RX ORDER — LISINOPRIL 2.5 MG/1
2.5 TABLET ORAL DAILY
Qty: 90 TABLET | Refills: 3 | Status: SHIPPED | OUTPATIENT
Start: 2022-09-22 | End: 2022-09-22

## 2022-09-22 RX ORDER — ROSUVASTATIN CALCIUM 10 MG/1
10 TABLET, COATED ORAL EVERY EVENING
Qty: 90 TABLET | Refills: 3 | Status: SHIPPED | OUTPATIENT
Start: 2022-09-22 | End: 2023-06-01 | Stop reason: SDUPTHER

## 2022-09-22 RX ORDER — FENOFIBRATE 145 MG/1
145 TABLET, COATED ORAL DAILY
Qty: 90 TABLET | Refills: 3 | Status: SHIPPED | OUTPATIENT
Start: 2022-09-22 | End: 2023-06-01 | Stop reason: SDUPTHER

## 2022-09-22 ASSESSMENT — FIBROSIS 4 INDEX: FIB4 SCORE: 1.2

## 2022-09-22 ASSESSMENT — PATIENT HEALTH QUESTIONNAIRE - PHQ9
CLINICAL INTERPRETATION OF PHQ2 SCORE: 3
5. POOR APPETITE OR OVEREATING: 0 - NOT AT ALL
SUM OF ALL RESPONSES TO PHQ QUESTIONS 1-9: 8

## 2022-09-22 NOTE — ASSESSMENT & PLAN NOTE
Severe uncontrolled.   Side effects with januvia  On ozempic x 2 weeks,  Last visit had provided rx for lantus, victoza but A1c increased from 12 to 13.5  Urgent referral to endo and pharmacy provided to help to avoid progressive end organ damage   A1c:   Lab Results   Component Value Date/Time    HBA1C 13.5 (H) 08/18/2022 0853    AVGLUC 341 08/18/2022 0853     Lipids:   Lab Results   Component Value Date/Time    CHOLSTRLTOT 252 (H) 08/18/2022 08:53 AM    TRIGLYCERIDE 448 (H) 08/18/2022 08:53 AM    HDL 28 (A) 08/18/2022 08:53 AM    LDL see below 08/18/2022 08:53 AM   ]  BMP:   Lab Results   Component Value Date/Time    SODIUM 131 (L) 08/18/2022 0853    POTASSIUM 4.4 08/18/2022 0853    CHLORIDE 93 (L) 08/18/2022 0853    CO2 22 08/18/2022 0853    GLUCOSE 392 (H) 08/18/2022 0853    BUN 22 08/18/2022 0853    CREATININE 0.97 08/18/2022 0853    CALCIUM 9.6 08/18/2022 0853    ANION 16.0 08/18/2022 0853     GFR:   Lab Results   Component Value Date/Time    IFAFR[place] >60 08/28/2015 1042    IFNOTAFR >60 08/28/2015 1042     Last eye exam: in clinic today  Foot exam: normal sensation to monofilament testing b/l no callus or ulcers  Diet: not following a diabetic diet as he notes can get A1c down to 7 on broth and popcorn but he likes to eat regular foods.   Advised eating 3 healthy meals, avoid snacking, water for drinking, no meals before 8 am or after 8 pm.     Blood sugars some days down to 110, when he wakes up they are in 300s.   lantus 10 units qhs  He eats twice a day, usually no breakfast sometimes giang and eggs.   He has a big salad for lunch and some meat and vegetables for dinner.   He has managed to lose a lot of weight.   He has a problem with always feeling hungry.   He has taken two shots of ozempic 0.25mg so far. Tolerating this so far.     Stop gemfibrozil  Start crestor, tricor, losartan, cough with lisinopril continue asa

## 2022-09-22 NOTE — ASSESSMENT & PLAN NOTE
Has severe uncontrolled DM2  Is on lopid should be on a statin   Discussed cardiovascular benefits and rx provided.

## 2022-09-23 ENCOUNTER — E-CONSULT (OUTPATIENT)
Dept: ENDOCRINOLOGY | Facility: MEDICAL CENTER | Age: 67
End: 2022-09-23
Payer: MEDICARE

## 2022-09-23 DIAGNOSIS — Z71.9 ENCOUNTER FOR CONSULTATION: ICD-10-CM

## 2022-09-23 PROCEDURE — 99448 NTRPROF PH1/NTRNET/EHR 21-30: CPT | Performed by: INTERNAL MEDICINE

## 2022-09-23 NOTE — PROGRESS NOTES
E-Consult Response     After careful review of the patient's information available in the medical record, the following are my findings and recommendations:    Reason for consult:  recommendations for t2dm patient     Summary of data reviewed: 67 male born on 1955 with Uncontrolled type 2 diabetes  He has hypothyroidism, hyperlipidemia, HTN and is overweight    A1c is 13.5% on 8/18/2022  Prev 5.9% in 2015    Was on MANE    Now on Metformin 500mg tid  Ozempic  0.25mg weekly  Lantus 10u nightly          Recommendations: agree with above meds although if patient could not afford victoza - ozempic may be na issue if he hits medicare gap    Check cpeptide with gen 65 and cmp to check if he is insulin deficient or not  Optimize basal target FBG of  - teach patient how to adjust basal to goal  (increase by 1 unit every other day)  If patient cannot afford ozempic recommend meal time insulin like Lyumjev or Humalog (lyumjev is better) start with 4u QAC and increase accordingly  Have him check his BG 3 times a day before each meal    Recommend referral to endocrinologist or pharmacy diabetes clinic    We have resources to help enroll him in patient assistance programs (PAP) as long as he qualifies        E-Consult Time: 21 minutes were spent with >50% of the total time spent discussing plan of care with requesting physician (Use code [id number])    Bryan Sosa M.D.

## 2022-09-28 NOTE — PROGRESS NOTES
Subjective:   Bryan Onofre is a 67 y.o. male here today for evaluation and management of:     Uncontrolled type 2 diabetes mellitus with hyperglycemia (HCC)  Severe uncontrolled.   Side effects with januvia  On ozempic x 2 weeks,  Last visit had provided rx for lantus, victoza but A1c increased from 12 to 13.5  Urgent referral to endo and pharmacy provided to help to avoid progressive end organ damage   A1c:   Lab Results   Component Value Date/Time    HBA1C 13.5 (H) 08/18/2022 0853    AVGLUC 341 08/18/2022 0853     Lipids:   Lab Results   Component Value Date/Time    CHOLSTRLTOT 252 (H) 08/18/2022 08:53 AM    TRIGLYCERIDE 448 (H) 08/18/2022 08:53 AM    HDL 28 (A) 08/18/2022 08:53 AM    LDL see below 08/18/2022 08:53 AM   ]  BMP:   Lab Results   Component Value Date/Time    SODIUM 131 (L) 08/18/2022 0853    POTASSIUM 4.4 08/18/2022 0853    CHLORIDE 93 (L) 08/18/2022 0853    CO2 22 08/18/2022 0853    GLUCOSE 392 (H) 08/18/2022 0853    BUN 22 08/18/2022 0853    CREATININE 0.97 08/18/2022 0853    CALCIUM 9.6 08/18/2022 0853    ANION 16.0 08/18/2022 0853     GFR:   Lab Results   Component Value Date/Time    IFAFR[place] >60 08/28/2015 1042    IFNOTAFR >60 08/28/2015 1042     Last eye exam: in clinic today  Foot exam: normal sensation to monofilament testing b/l no callus or ulcers  Diet: not following a diabetic diet as he notes can get A1c down to 7 on broth and popcorn but he likes to eat regular foods.   Advised eating 3 healthy meals, avoid snacking, water for drinking, no meals before 8 am or after 8 pm.     Blood sugars some days down to 110, when he wakes up they are in 300s.   lantus 10 units qhs  He eats twice a day, usually no breakfast sometimes giang and eggs.   He has a big salad for lunch and some meat and vegetables for dinner.   He has managed to lose a lot of weight.   He has a problem with always feeling hungry.   He has taken two shots of ozempic 0.25mg so far. Tolerating this so far.     Stop  gemfibrozil  Start crestor, tricor, losartan, cough with lisinopril continue asa        Hyperlipidemia  Has severe uncontrolled DM2  Is on lopid should be on a statin   Discussed cardiovascular benefits and rx provided.              Current medicines (including changes today)  Current Outpatient Medications   Medication Sig Dispense Refill    gabapentin (NEURONTIN) 600 MG tablet GABAPENTIN 600 MG TABS      Semaglutide,0.25 or 0.5MG/DOS, (OZEMPIC, 0.25 OR 0.5 MG/DOSE,) 2 MG/1.5ML Solution Pen-injector Inject 0.5 mg under the skin every 7 days. 3 mL 0    levothyroxine (SYNTHROID) 125 MCG Tab Take 1 Tablet by mouth every morning on an empty stomach. 90 Tablet 3    rosuvastatin (CRESTOR) 10 MG Tab Take 1 Tablet by mouth every evening. 90 Tablet 3    losartan (COZAAR) 25 MG Tab Take 1 Tablet by mouth every day. To protect kidneys 90 Tablet 3    fenofibrate (TRICOR) 145 MG Tab Take 1 Tablet by mouth every day. For high triglycerides 90 Tablet 3    aspirin (ASA) 81 MG Chew Tab chewable tablet Chew 1 Tablet every day. 100 Tablet 3    atenolol (TENORMIN) 25 MG Tab Take 1 Tablet by mouth every day. For high blood pressure 90 Tablet 3    budesonide-formoterol (SYMBICORT) 80-4.5 MCG/ACT Aerosol Inhale 2 Puffs 2 times a day. 30.6 g 3    DULoxetine (CYMBALTA) 60 MG Cap DR Particles delayed-release capsule Take 1 Capsule by mouth every day. For neuropathy pain 90 Capsule 3    hydroCHLOROthiazide (HYDRODIURIL) 25 MG Tab Take 1 Tablet by mouth every day. For high blood pressure 90 Tablet 3    metFORMIN (GLUCOPHAGE) 500 MG Tab Take 1 Tablet by mouth 3 times a day with meals. 270 Tablet 3    omeprazole (PRILOSEC) 20 MG delayed-release capsule Take 2 Capsules by mouth every day. For Vallecillo's esophagus 180 Capsule 3    tamsulosin (FLOMAX) 0.4 MG capsule Take 1 Capsule by mouth 1/2 hour after dinner. For prostate 90 Capsule 3    insulin glargine (LANTUS SOLOSTAR) 100 UNIT/ML Solution Pen-injector injection Inject 10 Units under the skin  "every evening for 90 days. 9 mL 0    albuterol (PROVENTIL) 90 MCG/ACT Aero Soln Inhale 2 Puffs every 6 hours as needed for Shortness of Breath.       No current facility-administered medications for this visit.     He  has a past medical history of Anesthesia, Arthritis, Vallecillo esophagus, Breath shortness, COPD, Diabetes (12/30/09), Diabetic neuropathy (HCC), Elevated LFT's, GERD (gastroesophageal reflux disease), Heart burn, Hiatus hernia syndrome, Hyperlipidemia, Hypertension, Indigestion, Pain, Sleep apnea, and Unspecified disorder of thyroid.    ROS  No chest pain, no shortness of breath, no abdominal pain       Objective:     /80   Pulse 68   Temp 36.1 °C (97 °F) (Temporal)   Resp 16   Ht 1.854 m (6' 1\")   Wt 98 kg (216 lb)   SpO2 97%  Body mass index is 28.5 kg/m².   Physical Exam:  Constitutional: Alert, no distress.  Skin: Warm, dry, good turgor, no rashes in visible areas.  Eye: Equal, round and reactive, conjunctiva clear, lids normal.  ENMT: Lips without lesions, good dentition, oropharynx clear.  Neck: Trachea midline, no masses, no thyromegaly. No cervical or supraclavicular lymphadenopathy  Respiratory: Unlabored respiratory effort, lungs clear to auscultation, no wheezes, no ronchi.  Cardiovascular: Normal S1, S2, no murmur, no edema.  Abdomen: Soft, non-tender, no masses, no hepatosplenomegaly.  Psych: Alert and oriented x3, normal affect and mood.        Assessment and Plan:   The following treatment plan was discussed    1. Screening for colorectal cancer  - Referral to GI for Colonoscopy    2. Uncontrolled type 2 diabetes mellitus with hyperglycemia (HCC)  - POCT Retinal Eye Exam  - E-consult to Endocrinology  - rosuvastatin (CRESTOR) 10 MG Tab; Take 1 Tablet by mouth every evening.  Dispense: 90 Tablet; Refill: 3    3. Hyperlipidemia, unspecified hyperlipidemia type  - rosuvastatin (CRESTOR) 10 MG Tab; Take 1 Tablet by mouth every evening.  Dispense: 90 Tablet; Refill: 3    4. " Hypothyroidism, unspecified type  - TSH WITH REFLEX TO FT4; Future    5. Family history of colon cancer in mother  - Referral to Gastroenterology    6. Screening for colon cancer  - Referral to Gastroenterology    Other orders  - gabapentin (NEURONTIN) 600 MG tablet; GABAPENTIN 600 MG TABS  - Semaglutide,0.25 or 0.5MG/DOS, (OZEMPIC, 0.25 OR 0.5 MG/DOSE,) 2 MG/1.5ML Solution Pen-injector; Inject 0.5 mg under the skin every 7 days.  Dispense: 3 mL; Refill: 0  - levothyroxine (SYNTHROID) 125 MCG Tab; Take 1 Tablet by mouth every morning on an empty stomach.  Dispense: 90 Tablet; Refill: 3  - losartan (COZAAR) 25 MG Tab; Take 1 Tablet by mouth every day. To protect kidneys  Dispense: 90 Tablet; Refill: 3  - fenofibrate (TRICOR) 145 MG Tab; Take 1 Tablet by mouth every day. For high triglycerides  Dispense: 90 Tablet; Refill: 3      Followup: Return in about 1 month (around 10/22/2022) for bring Blood sugar log. 2 mo review blood sugar log, 3 mo DM follow up, review labs.

## 2022-10-26 ENCOUNTER — HOSPITAL ENCOUNTER (OUTPATIENT)
Dept: LAB | Facility: MEDICAL CENTER | Age: 67
End: 2022-10-26
Attending: FAMILY MEDICINE
Payer: MEDICARE

## 2022-10-26 DIAGNOSIS — E03.9 HYPOTHYROIDISM, UNSPECIFIED TYPE: ICD-10-CM

## 2022-10-26 LAB — TSH SERPL DL<=0.005 MIU/L-ACNC: 1.09 UIU/ML (ref 0.38–5.33)

## 2022-10-26 PROCEDURE — 84443 ASSAY THYROID STIM HORMONE: CPT

## 2022-10-26 PROCEDURE — 36415 COLL VENOUS BLD VENIPUNCTURE: CPT

## 2022-11-08 ENCOUNTER — OFFICE VISIT (OUTPATIENT)
Dept: MEDICAL GROUP | Facility: PHYSICIAN GROUP | Age: 67
End: 2022-11-08
Payer: MEDICARE

## 2022-11-08 VITALS
HEIGHT: 73 IN | BODY MASS INDEX: 29.16 KG/M2 | OXYGEN SATURATION: 94 % | HEART RATE: 74 BPM | WEIGHT: 220 LBS | DIASTOLIC BLOOD PRESSURE: 70 MMHG | SYSTOLIC BLOOD PRESSURE: 122 MMHG | RESPIRATION RATE: 14 BRPM | TEMPERATURE: 97.2 F

## 2022-11-08 DIAGNOSIS — E78.5 HYPERLIPIDEMIA, UNSPECIFIED HYPERLIPIDEMIA TYPE: ICD-10-CM

## 2022-11-08 DIAGNOSIS — Z23 NEED FOR VACCINATION: ICD-10-CM

## 2022-11-08 DIAGNOSIS — E11.65 UNCONTROLLED TYPE 2 DIABETES MELLITUS WITH HYPERGLYCEMIA (HCC): ICD-10-CM

## 2022-11-08 DIAGNOSIS — Z13.6 SCREENING FOR AAA (ABDOMINAL AORTIC ANEURYSM): ICD-10-CM

## 2022-11-08 PROBLEM — E11.40 DIABETIC NEUROPATHY (HCC): Status: ACTIVE | Noted: 2022-11-08

## 2022-11-08 PROBLEM — J44.9 CHRONIC OBSTRUCTIVE PULMONARY DISEASE (HCC): Status: ACTIVE | Noted: 2022-11-08

## 2022-11-08 PROCEDURE — 99214 OFFICE O/P EST MOD 30 MIN: CPT | Performed by: FAMILY MEDICINE

## 2022-11-08 RX ORDER — POLYETHYLENE GLYCOL-3350 AND ELECTROLYTES 236; 6.74; 5.86; 2.97; 22.74 G/274.31G; G/274.31G; G/274.31G; G/274.31G; G/274.31G
POWDER, FOR SOLUTION ORAL
COMMUNITY
Start: 2022-10-03 | End: 2023-01-11

## 2022-11-08 RX ORDER — INSULIN GLARGINE 100 [IU]/ML
10 INJECTION, SOLUTION SUBCUTANEOUS EVERY EVENING
Qty: 9 ML | Refills: 6 | Status: SHIPPED | OUTPATIENT
Start: 2022-11-08 | End: 2023-02-06

## 2022-11-08 RX ORDER — CLOSTRIDIUM TETANI TOXOID ANTIGEN (FORMALDEHYDE INACTIVATED), CORYNEBACTERIUM DIPHTHERIAE TOXOID ANTIGEN (FORMALDEHYDE INACTIVATED), BORDETELLA PERTUSSIS TOXOID ANTIGEN (GLUTARALDEHYDE INACTIVATED), BORDETELLA PERTUSSIS FILAMENTOUS HEMAGGLUTININ ANTIGEN (FORMALDEHYDE INACTIVATED), BORDETELLA PERTUSSIS PERTACTIN ANTIGEN, AND BORDETELLA PERTUSSIS FIMBRIAE 2/3 ANTIGEN 5; 2; 2.5; 5; 3; 5 [LF]/.5ML; [LF]/.5ML; UG/.5ML; UG/.5ML; UG/.5ML; UG/.5ML
0.5 INJECTION, SUSPENSION INTRAMUSCULAR ONCE
Qty: 0.5 ML | Refills: 0 | Status: SHIPPED
Start: 2022-11-08 | End: 2022-11-08

## 2022-11-08 RX ORDER — SEMAGLUTIDE 1.34 MG/ML
0.5 INJECTION, SOLUTION SUBCUTANEOUS
Qty: 3 ML | Refills: 6 | Status: SHIPPED | OUTPATIENT
Start: 2022-11-08 | End: 2022-11-14 | Stop reason: SDUPTHER

## 2022-11-08 ASSESSMENT — FIBROSIS 4 INDEX: FIB4 SCORE: 1.2

## 2022-11-08 NOTE — PROGRESS NOTES
Subjective:   Bryan Onofre is a 67 y.o. male here today for evaluation and management of:     Uncontrolled type 2 diabetes mellitus with hyperglycemia (HCC)  Home A1c test was 7.2    A1c:   Lab Results   Component Value Date/Time    HBA1C 13.5 (H) 08/18/2022 0853    AVGLUC 341 08/18/2022 0853     Lipids:   Lab Results   Component Value Date/Time    CHOLSTRLTOT 252 (H) 08/18/2022 08:53 AM    TRIGLYCERIDE 448 (H) 08/18/2022 08:53 AM    HDL 28 (A) 08/18/2022 08:53 AM    LDL see below 08/18/2022 08:53 AM   ]  BMP:   Lab Results   Component Value Date/Time    SODIUM 131 (L) 08/18/2022 0853    POTASSIUM 4.4 08/18/2022 0853    CHLORIDE 93 (L) 08/18/2022 0853    CO2 22 08/18/2022 0853    GLUCOSE 392 (H) 08/18/2022 0853    BUN 22 08/18/2022 0853    CREATININE 0.97 08/18/2022 0853    CALCIUM 9.6 08/18/2022 0853    ANION 16.0 08/18/2022 0853     GFR:   Lab Results   Component Value Date/Time    IFAFR[place] >60 08/28/2015 1042    IFNOTAFR >60 08/28/2015 1042     Last eye exam:   Foot exam: normal sensation to monofilament b/l  Medications: ozempic, lantus     Has colonoscopy and endoscopy scheduled for this Friday.           Current medicines (including changes today)  Current Outpatient Medications   Medication Sig Dispense Refill    tetanus-dipth-acell pertussis (ADACEL) 5-2-15.5 LF-MCG/0.5 Suspension Inject 0.5 mL into the shoulder, thigh, or buttocks one time for 1 dose. 0.5 mL 0    Zoster Vac Recomb Adjuvanted (SHINGRIX) 50 MCG/0.5ML Recon Susp Inject 0.5 mL into the shoulder, thigh, or buttocks one time for 1 dose. 0.5 mL 0    Semaglutide,0.25 or 0.5MG/DOS, (OZEMPIC, 0.25 OR 0.5 MG/DOSE,) 2 MG/1.5ML Solution Pen-injector Inject 0.5 mg under the skin every 7 days. 3 mL 6    insulin glargine (LANTUS SOLOSTAR) 100 UNIT/ML Solution Pen-injector injection Inject 10 Units under the skin every evening for 90 days. 9 mL 6    levothyroxine (SYNTHROID) 125 MCG Tab Take 1 Tablet by mouth every morning on an empty stomach. 90  Tablet 3    rosuvastatin (CRESTOR) 10 MG Tab Take 1 Tablet by mouth every evening. 90 Tablet 3    losartan (COZAAR) 25 MG Tab Take 1 Tablet by mouth every day. To protect kidneys 90 Tablet 3    fenofibrate (TRICOR) 145 MG Tab Take 1 Tablet by mouth every day. For high triglycerides 90 Tablet 3    aspirin (ASA) 81 MG Chew Tab chewable tablet Chew 1 Tablet every day. 100 Tablet 3    atenolol (TENORMIN) 25 MG Tab Take 1 Tablet by mouth every day. For high blood pressure 90 Tablet 3    budesonide-formoterol (SYMBICORT) 80-4.5 MCG/ACT Aerosol Inhale 2 Puffs 2 times a day. 30.6 g 3    DULoxetine (CYMBALTA) 60 MG Cap DR Particles delayed-release capsule Take 1 Capsule by mouth every day. For neuropathy pain 90 Capsule 3    hydroCHLOROthiazide (HYDRODIURIL) 25 MG Tab Take 1 Tablet by mouth every day. For high blood pressure 90 Tablet 3    metFORMIN (GLUCOPHAGE) 500 MG Tab Take 1 Tablet by mouth 3 times a day with meals. 270 Tablet 3    omeprazole (PRILOSEC) 20 MG delayed-release capsule Take 2 Capsules by mouth every day. For Vallecillo's esophagus 180 Capsule 3    tamsulosin (FLOMAX) 0.4 MG capsule Take 1 Capsule by mouth 1/2 hour after dinner. For prostate 90 Capsule 3    albuterol (PROVENTIL) 90 MCG/ACT Aero Soln Inhale 2 Puffs every 6 hours as needed for Shortness of Breath.      Aspirin 162.5 MG CAPSULE SR 24 HR ASPIRIN 81 MG ORAL TABLET      GAVILYTE-G 236 g Recon Soln USE AS DIRECTED      gabapentin (NEURONTIN) 600 MG tablet GABAPENTIN 600 MG TABS       No current facility-administered medications for this visit.     He  has a past medical history of Anesthesia, Arthritis, Vallecillo esophagus, Breath shortness, COPD, Diabetes (12/30/09), Diabetic neuropathy (HCC), Elevated LFT's, GERD (gastroesophageal reflux disease), Heart burn, Hiatus hernia syndrome, Hyperlipidemia, Hypertension, Indigestion, Pain, Sleep apnea, and Unspecified disorder of thyroid.    ROS  No chest pain, no shortness of breath, no abdominal pain        "Objective:     /70 (BP Location: Left arm, Patient Position: Sitting, BP Cuff Size: Adult)   Pulse 74   Temp 36.2 °C (97.2 °F) (Temporal)   Resp 14   Ht 1.854 m (6' 1\")   Wt 99.8 kg (220 lb)   SpO2 94%  Body mass index is 29.03 kg/m².   Physical Exam:  Constitutional: Alert, no distress, well-groomed.  Skin: No rashes in visible areas.  Eye: Round. Conjunctiva clear, lids normal. No icterus.   ENMT: Lips pink without lesions, good dentition, moist mucous membranes. Phonation normal.  Neck: No masses, no thyromegaly. Moves freely without pain.  Respiratory: Unlabored respiratory effort, no cough or audible wheeze  Psych: Alert and oriented x3, normal affect and mood.          Assessment and Plan:   The following treatment plan was discussed    1. Uncontrolled type 2 diabetes mellitus with hyperglycemia (HCC)  - Diabetic Monofilament LE Exam  - Comp Metabolic Panel; Future  - HEMOGLOBIN A1C; Future    2. Need for vaccination  - tetanus-dipth-acell pertussis (ADACEL) 5-2-15.5 LF-MCG/0.5 Suspension; Inject 0.5 mL into the shoulder, thigh, or buttocks one time for 1 dose.  Dispense: 0.5 mL; Refill: 0  - Zoster Vac Recomb Adjuvanted (SHINGRIX) 50 MCG/0.5ML Recon Susp; Inject 0.5 mL into the shoulder, thigh, or buttocks one time for 1 dose.  Dispense: 0.5 mL; Refill: 0    3. Screening for AAA (abdominal aortic aneurysm)  - US-ABDOMINAL AORTA W/O DOPPLER; Future    4. Hyperlipidemia, unspecified hyperlipidemia type  - Lipid Profile; Future    Other orders  - Aspirin 162.5 MG CAPSULE SR 24 HR; ASPIRIN 81 MG ORAL TABLET  - GAVILYTE-G 236 g Recon Soln; USE AS DIRECTED  - Semaglutide,0.25 or 0.5MG/DOS, (OZEMPIC, 0.25 OR 0.5 MG/DOSE,) 2 MG/1.5ML Solution Pen-injector; Inject 0.5 mg under the skin every 7 days.  Dispense: 3 mL; Refill: 6  - insulin glargine (LANTUS SOLOSTAR) 100 UNIT/ML Solution Pen-injector injection; Inject 10 Units under the skin every evening for 90 days.  Dispense: 9 mL; Refill: 6      Followup: " Return for please cancel Dec appt, will see pt in Jan.

## 2022-11-08 NOTE — PATIENT INSTRUCTIONS
Please get fasting labs, fasting for 8-10 hours before next visit. You can make an appointment for the lab or walk in.   Lab hours [facility] location: Monday to Friday 6 am - 4 pm, Saturday 7 am -noon  Even if you lose your lab paperwork, you can still come in to get your lab done.

## 2022-11-08 NOTE — ASSESSMENT & PLAN NOTE
Home A1c test was 7.2    A1c:   Lab Results   Component Value Date/Time    HBA1C 13.5 (H) 08/18/2022 0853    AVGLUC 341 08/18/2022 0853     Lipids:   Lab Results   Component Value Date/Time    CHOLSTRLTOT 252 (H) 08/18/2022 08:53 AM    TRIGLYCERIDE 448 (H) 08/18/2022 08:53 AM    HDL 28 (A) 08/18/2022 08:53 AM    LDL see below 08/18/2022 08:53 AM   ]  BMP:   Lab Results   Component Value Date/Time    SODIUM 131 (L) 08/18/2022 0853    POTASSIUM 4.4 08/18/2022 0853    CHLORIDE 93 (L) 08/18/2022 0853    CO2 22 08/18/2022 0853    GLUCOSE 392 (H) 08/18/2022 0853    BUN 22 08/18/2022 0853    CREATININE 0.97 08/18/2022 0853    CALCIUM 9.6 08/18/2022 0853    ANION 16.0 08/18/2022 0853     GFR:   Lab Results   Component Value Date/Time    IFAFR[place] >60 08/28/2015 1042    IFNOTAFR >60 08/28/2015 1042     Last eye exam:   Foot exam: normal sensation to monofilament b/l  Medications: ozempic, lantus

## 2022-11-09 PROBLEM — I10 HYPERTENSION: Status: ACTIVE | Noted: 2022-11-09

## 2022-11-09 PROBLEM — R80.9 MICROALBUMINURIA: Status: ACTIVE | Noted: 2022-11-09

## 2022-11-09 PROBLEM — E55.9 VITAMIN D DEFICIENCY: Status: ACTIVE | Noted: 2022-11-09

## 2022-11-14 NOTE — TELEPHONE ENCOUNTER
Pt is requesting to have medication be sent to [facility] because insurance is no longer covering Rx through [facility]. Pt was Due 11/14/2022 for dose and was just informed by pharmacy of the insurance issues.

## 2022-11-15 RX ORDER — SEMAGLUTIDE 1.34 MG/ML
0.5 INJECTION, SOLUTION SUBCUTANEOUS
Qty: 3 ML | Refills: 6 | Status: SHIPPED | OUTPATIENT
Start: 2022-11-15

## 2023-01-05 ENCOUNTER — HOSPITAL ENCOUNTER (OUTPATIENT)
Dept: LAB | Facility: MEDICAL CENTER | Age: 68
End: 2023-01-05
Attending: FAMILY MEDICINE
Payer: MEDICARE

## 2023-01-05 DIAGNOSIS — E78.5 HYPERLIPIDEMIA, UNSPECIFIED HYPERLIPIDEMIA TYPE: ICD-10-CM

## 2023-01-05 DIAGNOSIS — E11.65 UNCONTROLLED TYPE 2 DIABETES MELLITUS WITH HYPERGLYCEMIA (HCC): ICD-10-CM

## 2023-01-05 LAB
ALBUMIN SERPL BCP-MCNC: 4.8 G/DL (ref 3.2–4.9)
ALBUMIN/GLOB SERPL: 1.5 G/DL
ALP SERPL-CCNC: 58 U/L (ref 30–99)
ALT SERPL-CCNC: 13 U/L (ref 2–50)
ANION GAP SERPL CALC-SCNC: 10 MMOL/L (ref 7–16)
AST SERPL-CCNC: 16 U/L (ref 12–45)
BILIRUB SERPL-MCNC: 0.4 MG/DL (ref 0.1–1.5)
BUN SERPL-MCNC: 20 MG/DL (ref 8–22)
CALCIUM ALBUM COR SERPL-MCNC: 9.4 MG/DL (ref 8.5–10.5)
CALCIUM SERPL-MCNC: 10 MG/DL (ref 8.5–10.5)
CHLORIDE SERPL-SCNC: 96 MMOL/L (ref 96–112)
CHOLEST SERPL-MCNC: 129 MG/DL (ref 100–199)
CO2 SERPL-SCNC: 26 MMOL/L (ref 20–33)
CREAT SERPL-MCNC: 1.14 MG/DL (ref 0.5–1.4)
EST. AVERAGE GLUCOSE BLD GHB EST-MCNC: 183 MG/DL
FASTING STATUS PATIENT QL REPORTED: NORMAL
GFR SERPLBLD CREATININE-BSD FMLA CKD-EPI: 69 ML/MIN/1.73 M 2
GLOBULIN SER CALC-MCNC: 3.2 G/DL (ref 1.9–3.5)
GLUCOSE SERPL-MCNC: 163 MG/DL (ref 65–99)
HBA1C MFR BLD: 8 % (ref 4–5.6)
HDLC SERPL-MCNC: 24 MG/DL
LDLC SERPL CALC-MCNC: 45 MG/DL
POTASSIUM SERPL-SCNC: 4.8 MMOL/L (ref 3.6–5.5)
PROT SERPL-MCNC: 8 G/DL (ref 6–8.2)
SODIUM SERPL-SCNC: 132 MMOL/L (ref 135–145)
TRIGL SERPL-MCNC: 300 MG/DL (ref 0–149)

## 2023-01-05 PROCEDURE — 36415 COLL VENOUS BLD VENIPUNCTURE: CPT

## 2023-01-05 PROCEDURE — 83036 HEMOGLOBIN GLYCOSYLATED A1C: CPT | Mod: GA

## 2023-01-05 PROCEDURE — 80053 COMPREHEN METABOLIC PANEL: CPT

## 2023-01-05 PROCEDURE — 80061 LIPID PANEL: CPT

## 2023-01-11 ENCOUNTER — OFFICE VISIT (OUTPATIENT)
Dept: MEDICAL GROUP | Facility: PHYSICIAN GROUP | Age: 68
End: 2023-01-11
Payer: MEDICARE

## 2023-01-11 VITALS
HEART RATE: 72 BPM | DIASTOLIC BLOOD PRESSURE: 82 MMHG | OXYGEN SATURATION: 96 % | WEIGHT: 226 LBS | RESPIRATION RATE: 16 BRPM | TEMPERATURE: 97.6 F | HEIGHT: 73 IN | BODY MASS INDEX: 29.95 KG/M2 | SYSTOLIC BLOOD PRESSURE: 124 MMHG

## 2023-01-11 DIAGNOSIS — Z11.1 SCREENING-PULMONARY TB: ICD-10-CM

## 2023-01-11 DIAGNOSIS — E78.5 HYPERLIPIDEMIA, UNSPECIFIED HYPERLIPIDEMIA TYPE: ICD-10-CM

## 2023-01-11 DIAGNOSIS — E11.65 UNCONTROLLED TYPE 2 DIABETES MELLITUS WITH HYPERGLYCEMIA (HCC): ICD-10-CM

## 2023-01-11 DIAGNOSIS — L40.0 PLAQUE PSORIASIS: ICD-10-CM

## 2023-01-11 PROCEDURE — 99214 OFFICE O/P EST MOD 30 MIN: CPT | Performed by: FAMILY MEDICINE

## 2023-01-11 RX ORDER — TACROLIMUS 1 MG/G
OINTMENT TOPICAL
Qty: 100 G | Refills: 3 | Status: SHIPPED | OUTPATIENT
Start: 2023-01-11

## 2023-01-11 RX ORDER — CLOBETASOL PROPIONATE 0.5 MG/G
CREAM TOPICAL
Qty: 60 G | Refills: 11 | Status: SHIPPED | OUTPATIENT
Start: 2023-01-11

## 2023-01-11 ASSESSMENT — FIBROSIS 4 INDEX: FIB4 SCORE: 1.26

## 2023-01-11 ASSESSMENT — PATIENT HEALTH QUESTIONNAIRE - PHQ9: CLINICAL INTERPRETATION OF PHQ2 SCORE: 0

## 2023-01-11 NOTE — PROGRESS NOTES
Subjective:   Bryan Onofre is a 67 y.o. male here today for evaluation and management of:     Uncontrolled type 2 diabetes mellitus with hyperglycemia (HCC)  Sig improvement from 13 down to 8    A1c:   Lab Results   Component Value Date/Time    HBA1C 8.0 (H) 01/05/2023 0741    AVGLUC 183 01/05/2023 0741     Lipids:   Lab Results   Component Value Date/Time    CHOLSTRLTOT 129 01/05/2023 07:41 AM    TRIGLYCERIDE 300 (H) 01/05/2023 07:41 AM    HDL 24 (A) 01/05/2023 07:41 AM    LDL 45 01/05/2023 07:41 AM   ]  BMP:   Lab Results   Component Value Date/Time    SODIUM 132 (L) 01/05/2023 0741    POTASSIUM 4.8 01/05/2023 0741    CHLORIDE 96 01/05/2023 0741    CO2 26 01/05/2023 0741    GLUCOSE 163 (H) 01/05/2023 0741    BUN 20 01/05/2023 0741    CREATININE 1.14 01/05/2023 0741    CALCIUM 10.0 01/05/2023 0741    ANION 10.0 01/05/2023 0741     GFR:   Lab Results   Component Value Date/Time    IFAFR[place] >60 08/28/2015 1042    IFNOTAFR >60 08/28/2015 1042     Last eye exam: advised him to get this scheduled for retinal exam.   Foot exam: reports no foot ulcers.   Medications: metformin, ozempic, asa, rosuvastatin, losartan, fenofibrate, lantus 10 units qhs           He was told by [facility] that one of his pills needed new RX, not sure which one.   He will send me a msg as I don't have anything from [facility] yet.       Current medicines (including changes today)  Current Outpatient Medications   Medication Sig Dispense Refill    Semaglutide,0.25 or 0.5MG/DOS, (OZEMPIC, 0.25 OR 0.5 MG/DOSE,) 2 MG/1.5ML Solution Pen-injector Inject 0.5 mg under the skin every 7 days. 3 mL 6    Aspirin 162.5 MG CAPSULE SR 24 HR ASPIRIN 81 MG ORAL TABLET      insulin glargine (LANTUS SOLOSTAR) 100 UNIT/ML Solution Pen-injector injection Inject 10 Units under the skin every evening for 90 days. 9 mL 6    levothyroxine (SYNTHROID) 125 MCG Tab Take 1 Tablet by mouth every morning on an empty stomach. 90 Tablet 3    rosuvastatin (CRESTOR) 10 MG Tab Take 1  "Tablet by mouth every evening. 90 Tablet 3    losartan (COZAAR) 25 MG Tab Take 1 Tablet by mouth every day. To protect kidneys 90 Tablet 3    fenofibrate (TRICOR) 145 MG Tab Take 1 Tablet by mouth every day. For high triglycerides 90 Tablet 3    aspirin (ASA) 81 MG Chew Tab chewable tablet Chew 1 Tablet every day. 100 Tablet 3    atenolol (TENORMIN) 25 MG Tab Take 1 Tablet by mouth every day. For high blood pressure 90 Tablet 3    budesonide-formoterol (SYMBICORT) 80-4.5 MCG/ACT Aerosol Inhale 2 Puffs 2 times a day. 30.6 g 3    DULoxetine (CYMBALTA) 60 MG Cap DR Particles delayed-release capsule Take 1 Capsule by mouth every day. For neuropathy pain 90 Capsule 3    hydroCHLOROthiazide (HYDRODIURIL) 25 MG Tab Take 1 Tablet by mouth every day. For high blood pressure 90 Tablet 3    metFORMIN (GLUCOPHAGE) 500 MG Tab Take 1 Tablet by mouth 3 times a day with meals. 270 Tablet 3    omeprazole (PRILOSEC) 20 MG delayed-release capsule Take 2 Capsules by mouth every day. For Vallecillo's esophagus 180 Capsule 3    tamsulosin (FLOMAX) 0.4 MG capsule Take 1 Capsule by mouth 1/2 hour after dinner. For prostate 90 Capsule 3    albuterol (PROVENTIL) 90 MCG/ACT Aero Soln Inhale 2 Puffs every 6 hours as needed for Shortness of Breath.      GAVILYTE-G 236 g Recon Soln USE AS DIRECTED       No current facility-administered medications for this visit.     He  has a past medical history of Anesthesia, Arthritis, Vallecillo esophagus, Breath shortness, COPD, Diabetes (12/30/09), Diabetic neuropathy (HCC), Elevated LFT's, GERD (gastroesophageal reflux disease), Heart burn, Hiatus hernia syndrome, Hyperlipidemia, Hypertension, Indigestion, Pain, Sleep apnea, and Unspecified disorder of thyroid.    ROS  No chest pain, no shortness of breath, no abdominal pain       Objective:     /82 (BP Location: Left arm, Patient Position: Sitting, BP Cuff Size: Adult)   Pulse 72   Temp 36.4 °C (97.6 °F) (Temporal)   Resp 16   Ht 1.854 m (6' 1\")   " Wt 103 kg (226 lb)   SpO2 96%  Body mass index is 29.82 kg/m².   Physical Exam:  Constitutional: Alert, no distress.  Skin: Warm, dry, good turgor, no rashes in visible areas.  Eye: Equal, round and reactive, conjunctiva clear, lids normal.  ENMT: Lips without lesions, good dentition, oropharynx clear.  Neck: Trachea midline, no masses, no thyromegaly. No cervical or supraclavicular lymphadenopathy  Respiratory: Unlabored respiratory effort, lungs clear to auscultation, no wheezes, no ronchi.  Cardiovascular: Normal S1, S2, no murmur, no edema.  Abdomen: Soft, non-tender, no masses, no hepatosplenomegaly.  Psych: Alert and oriented x3, normal affect and mood.        Assessment and Plan:   The following treatment plan was discussed    1. Uncontrolled type 2 diabetes mellitus with hyperglycemia (HCC)  - HEMOGLOBIN A1C; Future  - Comp Metabolic Panel; Future    2. Hyperlipidemia, unspecified hyperlipidemia type  - Lipid Profile; Future      Followup: Return in about 3 months (around 4/11/2023) for Diabetes, high triglycerides. retinal exam next visit if not done. .

## 2023-01-11 NOTE — PATIENT INSTRUCTIONS
Please get fasting labs, fasting for 8-10 hours before next visit. You can make an appointment for the lab or walk in.   Lab hours [facility] location: Monday to Friday 6 am - 4 pm, Saturday 7 am -noon  Even if you lose your lab paperwork, you can still come in to get your lab done.     Please check [employer] for your referral information or call the referral department at .   You can also send me a [employer] message regarding your referral information.   Please note you will probably not get a call regarding the referral.

## 2023-01-11 NOTE — ASSESSMENT & PLAN NOTE
Moderate to severe plaque psoriasis was controlled with 3 times a week light therapy. Last therapy was a year ago. When not on light therapy he has flares affecting hair, neck, arms, hands, legs and bottom of his feet. Worse on chest and shoulders with larger areas involved. Eyelids and eyebrows also affected.   Provided high potency steroid cream, encouraged emollient.

## 2023-01-11 NOTE — ASSESSMENT & PLAN NOTE
Sig improvement from 13 down to 8    A1c:   Lab Results   Component Value Date/Time    HBA1C 8.0 (H) 01/05/2023 0741    AVGLUC 183 01/05/2023 0741     Lipids:   Lab Results   Component Value Date/Time    CHOLSTRLTOT 129 01/05/2023 07:41 AM    TRIGLYCERIDE 300 (H) 01/05/2023 07:41 AM    HDL 24 (A) 01/05/2023 07:41 AM    LDL 45 01/05/2023 07:41 AM   ]  BMP:   Lab Results   Component Value Date/Time    SODIUM 132 (L) 01/05/2023 0741    POTASSIUM 4.8 01/05/2023 0741    CHLORIDE 96 01/05/2023 0741    CO2 26 01/05/2023 0741    GLUCOSE 163 (H) 01/05/2023 0741    BUN 20 01/05/2023 0741    CREATININE 1.14 01/05/2023 0741    CALCIUM 10.0 01/05/2023 0741    ANION 10.0 01/05/2023 0741     GFR:   Lab Results   Component Value Date/Time    IF[place] >60 08/28/2015 1042    IFNOTAFR >60 08/28/2015 1042     Last eye exam: advised him to get this scheduled for retinal exam.   Foot exam: reports no foot ulcers.   Medications: metformin, ozempic, asa, rosuvastatin, losartan, fenofibrate, lantus 10 units qhs

## 2023-04-07 ENCOUNTER — HOSPITAL ENCOUNTER (OUTPATIENT)
Dept: LAB | Facility: MEDICAL CENTER | Age: 68
End: 2023-04-07
Attending: FAMILY MEDICINE
Payer: MEDICARE

## 2023-04-07 DIAGNOSIS — E78.5 HYPERLIPIDEMIA, UNSPECIFIED HYPERLIPIDEMIA TYPE: ICD-10-CM

## 2023-04-07 DIAGNOSIS — Z11.1 SCREENING-PULMONARY TB: ICD-10-CM

## 2023-04-07 DIAGNOSIS — E11.65 UNCONTROLLED TYPE 2 DIABETES MELLITUS WITH HYPERGLYCEMIA (HCC): ICD-10-CM

## 2023-04-07 LAB
EST. AVERAGE GLUCOSE BLD GHB EST-MCNC: 192 MG/DL
HBA1C MFR BLD: 8.3 % (ref 4–5.6)

## 2023-04-07 PROCEDURE — 36415 COLL VENOUS BLD VENIPUNCTURE: CPT

## 2023-04-07 PROCEDURE — 80053 COMPREHEN METABOLIC PANEL: CPT

## 2023-04-07 PROCEDURE — 83036 HEMOGLOBIN GLYCOSYLATED A1C: CPT | Mod: GA

## 2023-04-07 PROCEDURE — 80061 LIPID PANEL: CPT

## 2023-04-08 LAB
ALBUMIN SERPL BCP-MCNC: 4.3 G/DL (ref 3.2–4.9)
ALBUMIN/GLOB SERPL: 1.3 G/DL
ALP SERPL-CCNC: 59 U/L (ref 30–99)
ALT SERPL-CCNC: 19 U/L (ref 2–50)
ANION GAP SERPL CALC-SCNC: 14 MMOL/L (ref 7–16)
AST SERPL-CCNC: 16 U/L (ref 12–45)
BILIRUB SERPL-MCNC: 0.5 MG/DL (ref 0.1–1.5)
BUN SERPL-MCNC: 15 MG/DL (ref 8–22)
CALCIUM ALBUM COR SERPL-MCNC: 9.4 MG/DL (ref 8.5–10.5)
CALCIUM SERPL-MCNC: 9.6 MG/DL (ref 8.5–10.5)
CHLORIDE SERPL-SCNC: 101 MMOL/L (ref 96–112)
CHOLEST SERPL-MCNC: 121 MG/DL (ref 100–199)
CO2 SERPL-SCNC: 24 MMOL/L (ref 20–33)
CREAT SERPL-MCNC: 0.9 MG/DL (ref 0.5–1.4)
GFR SERPLBLD CREATININE-BSD FMLA CKD-EPI: 92 ML/MIN/1.73 M 2
GLOBULIN SER CALC-MCNC: 3.2 G/DL (ref 1.9–3.5)
GLUCOSE SERPL-MCNC: 215 MG/DL (ref 65–99)
HDLC SERPL-MCNC: 31 MG/DL
LDLC SERPL CALC-MCNC: 35 MG/DL
POTASSIUM SERPL-SCNC: 4.7 MMOL/L (ref 3.6–5.5)
PROT SERPL-MCNC: 7.5 G/DL (ref 6–8.2)
SODIUM SERPL-SCNC: 139 MMOL/L (ref 135–145)
TRIGL SERPL-MCNC: 274 MG/DL (ref 0–149)

## 2023-04-12 ENCOUNTER — OFFICE VISIT (OUTPATIENT)
Dept: MEDICAL GROUP | Facility: PHYSICIAN GROUP | Age: 68
End: 2023-04-12
Payer: MEDICARE

## 2023-04-12 VITALS
BODY MASS INDEX: 29.82 KG/M2 | TEMPERATURE: 97.9 F | HEART RATE: 73 BPM | HEIGHT: 73 IN | OXYGEN SATURATION: 96 % | RESPIRATION RATE: 14 BRPM | WEIGHT: 225 LBS | SYSTOLIC BLOOD PRESSURE: 136 MMHG | DIASTOLIC BLOOD PRESSURE: 80 MMHG

## 2023-04-12 DIAGNOSIS — J43.9 PULMONARY EMPHYSEMA, UNSPECIFIED EMPHYSEMA TYPE (HCC): ICD-10-CM

## 2023-04-12 DIAGNOSIS — E11.65 UNCONTROLLED TYPE 2 DIABETES MELLITUS WITH HYPERGLYCEMIA (HCC): ICD-10-CM

## 2023-04-12 DIAGNOSIS — Z23 NEED FOR VACCINATION: ICD-10-CM

## 2023-04-12 LAB — RETINAL SCREEN: NORMAL

## 2023-04-12 PROCEDURE — 99214 OFFICE O/P EST MOD 30 MIN: CPT | Performed by: FAMILY MEDICINE

## 2023-04-12 RX ORDER — CLOSTRIDIUM TETANI TOXOID ANTIGEN (FORMALDEHYDE INACTIVATED), CORYNEBACTERIUM DIPHTHERIAE TOXOID ANTIGEN (FORMALDEHYDE INACTIVATED), BORDETELLA PERTUSSIS TOXOID ANTIGEN (GLUTARALDEHYDE INACTIVATED), BORDETELLA PERTUSSIS FILAMENTOUS HEMAGGLUTININ ANTIGEN (FORMALDEHYDE INACTIVATED), BORDETELLA PERTUSSIS PERTACTIN ANTIGEN, AND BORDETELLA PERTUSSIS FIMBRIAE 2/3 ANTIGEN 5; 2; 2.5; 5; 3; 5 [LF]/.5ML; [LF]/.5ML; UG/.5ML; UG/.5ML; UG/.5ML; UG/.5ML
0.5 INJECTION, SUSPENSION INTRAMUSCULAR ONCE
Qty: 0.5 ML | Refills: 0 | Status: SHIPPED
Start: 2023-04-12 | End: 2023-04-12

## 2023-04-12 RX ORDER — INSULIN GLARGINE 100 [IU]/ML
INJECTION, SOLUTION SUBCUTANEOUS EVERY EVENING
COMMUNITY

## 2023-04-12 ASSESSMENT — FIBROSIS 4 INDEX: FIB4 SCORE: 1.06

## 2023-04-12 NOTE — ASSESSMENT & PLAN NOTE
Stable, uncontrolled  He and his wife got covid last month and were isolating at home for 15 days and ate everything at home.     He is on lantus 10 units   Lowest blood sugar 129  Can increase to 15 units/day if blood sugars still >140s after diet changes.   Continue met 1000 bid,   crestor 10   Losartan  Fenofibrate,   ozempic 0.75 mg  A1c:   Lab Results   Component Value Date/Time    HBA1C 8.3 (H) 04/07/2023 0723    AVGLUC 192 04/07/2023 0723     Lipids:   Lab Results   Component Value Date/Time    CHOLSTRLTOT 121 04/07/2023 07:23 AM    TRIGLYCERIDE 274 (H) 04/07/2023 07:23 AM    HDL 31 (A) 04/07/2023 07:23 AM    LDL 35 04/07/2023 07:23 AM   ]  BMP:   Lab Results   Component Value Date/Time    SODIUM 139 04/07/2023 0723    POTASSIUM 4.7 04/07/2023 0723    CHLORIDE 101 04/07/2023 0723    CO2 24 04/07/2023 0723    GLUCOSE 215 (H) 04/07/2023 0723    BUN 15 04/07/2023 0723    CREATININE 0.90 04/07/2023 0723    CALCIUM 9.6 04/07/2023 0723    ANION 14.0 04/07/2023 0723     GFR:   Lab Results   Component Value Date/Time    IF[place] >60 08/28/2015 1042    IFNOTAFR >60 08/28/2015 1042     Last eye exam: unable to get in clinic today. Ref to optometry provided.   Foot exam:   Medications:

## 2023-04-13 NOTE — PROGRESS NOTES
Subjective:   Bryan Onofre is a 68 y.o. male here today for evaluation and management of:     Uncontrolled type 2 diabetes mellitus with hyperglycemia (HCC)  Stable, uncontrolled  He and his wife got covid last month and were isolating at home for 15 days and ate everything at home.     He is on lantus 10 units   Lowest blood sugar 129  Can increase to 15 units/day if blood sugars still >140s after diet changes.   Continue met 1000 bid,   crestor 10   Losartan  Fenofibrate,   ozempic 0.75 mg  A1c:   Lab Results   Component Value Date/Time    HBA1C 8.3 (H) 04/07/2023 0723    AVGLUC 192 04/07/2023 0723     Lipids:   Lab Results   Component Value Date/Time    CHOLSTRLTOT 121 04/07/2023 07:23 AM    TRIGLYCERIDE 274 (H) 04/07/2023 07:23 AM    HDL 31 (A) 04/07/2023 07:23 AM    LDL 35 04/07/2023 07:23 AM   ]  BMP:   Lab Results   Component Value Date/Time    SODIUM 139 04/07/2023 0723    POTASSIUM 4.7 04/07/2023 0723    CHLORIDE 101 04/07/2023 0723    CO2 24 04/07/2023 0723    GLUCOSE 215 (H) 04/07/2023 0723    BUN 15 04/07/2023 0723    CREATININE 0.90 04/07/2023 0723    CALCIUM 9.6 04/07/2023 0723    ANION 14.0 04/07/2023 0723     GFR:   Lab Results   Component Value Date/Time    IF[place] >60 08/28/2015 1042    IFNOTAFR >60 08/28/2015 1042     Last eye exam: unable to get in clinic today. Ref to optometry provided.   Foot exam:   Medications:                Current medicines (including changes today)  Current Outpatient Medications   Medication Sig Dispense Refill    insulin glargine (LANTUS SOLOSTAR) 100 UNIT/ML Solution Pen-injector injection Inject  under the skin every evening.      clobetasol (TEMOVATE) 0.05 % Cream Apply in a thin film to affected skin once a day for plaque psoriasis. 60 g 11    tacrolimus (PROTOPIC) 0.1 % Ointment Apply a thin film to affected skin twice a day for 6-8 week for plaque psoriasis. 100 g 3    Semaglutide,0.25 or 0.5MG/DOS, (OZEMPIC, 0.25 OR 0.5 MG/DOSE,) 2 MG/1.5ML Solution  Pen-injector Inject 0.5 mg under the skin every 7 days. 3 mL 6    Aspirin 162.5 MG CAPSULE SR 24 HR ASPIRIN 81 MG ORAL TABLET      levothyroxine (SYNTHROID) 125 MCG Tab Take 1 Tablet by mouth every morning on an empty stomach. 90 Tablet 3    rosuvastatin (CRESTOR) 10 MG Tab Take 1 Tablet by mouth every evening. 90 Tablet 3    losartan (COZAAR) 25 MG Tab Take 1 Tablet by mouth every day. To protect kidneys 90 Tablet 3    fenofibrate (TRICOR) 145 MG Tab Take 1 Tablet by mouth every day. For high triglycerides 90 Tablet 3    aspirin (ASA) 81 MG Chew Tab chewable tablet Chew 1 Tablet every day. 100 Tablet 3    atenolol (TENORMIN) 25 MG Tab Take 1 Tablet by mouth every day. For high blood pressure 90 Tablet 3    budesonide-formoterol (SYMBICORT) 80-4.5 MCG/ACT Aerosol Inhale 2 Puffs 2 times a day. 30.6 g 3    DULoxetine (CYMBALTA) 60 MG Cap DR Particles delayed-release capsule Take 1 Capsule by mouth every day. For neuropathy pain 90 Capsule 3    hydroCHLOROthiazide (HYDRODIURIL) 25 MG Tab Take 1 Tablet by mouth every day. For high blood pressure 90 Tablet 3    metFORMIN (GLUCOPHAGE) 500 MG Tab Take 1 Tablet by mouth 3 times a day with meals. 270 Tablet 3    omeprazole (PRILOSEC) 20 MG delayed-release capsule Take 2 Capsules by mouth every day. For Vallecillo's esophagus 180 Capsule 3    tamsulosin (FLOMAX) 0.4 MG capsule Take 1 Capsule by mouth 1/2 hour after dinner. For prostate 90 Capsule 3    albuterol (PROVENTIL) 90 MCG/ACT Aero Soln Inhale 2 Puffs every 6 hours as needed for Shortness of Breath.       No current facility-administered medications for this visit.     He  has a past medical history of Anesthesia, Arthritis, Vallecillo esophagus, Breath shortness, COPD, Diabetes (12/30/09), Diabetic neuropathy (HCC), Elevated LFT's, GERD (gastroesophageal reflux disease), Heart burn, Hiatus hernia syndrome, Hyperlipidemia, Hypertension, Indigestion, Pain, Sleep apnea, and Unspecified disorder of thyroid.    ROS  No chest  "pain, no shortness of breath, no abdominal pain       Objective:     /80 (BP Location: Right arm, Patient Position: Sitting, BP Cuff Size: Adult)   Pulse 73   Temp 36.6 °C (97.9 °F) (Temporal)   Resp 14   Ht 1.854 m (6' 1\")   Wt 102 kg (225 lb)   SpO2 96%  Body mass index is 29.69 kg/m².   Physical Exam:  Constitutional: Alert, no distress.  Skin: Warm, dry, good turgor, no rashes in visible areas.  Eye: Equal, round and reactive, conjunctiva clear, lids normal.  ENMT: Lips without lesions, good dentition, oropharynx clear.  Neck: Trachea midline, no masses, no thyromegaly. No cervical or supraclavicular lymphadenopathy  Respiratory: Unlabored respiratory effort, lungs clear to auscultation, no wheezes, no ronchi.  Cardiovascular: Normal S1, S2, no murmur, no edema.  Abdomen: Soft, non-tender, no masses, no hepatosplenomegaly.  Psych: Alert and oriented x3, normal affect and mood.        Assessment and Plan:   The following treatment plan was discussed    1. Need for vaccination  - tetanus-dipth-acell pertussis (ADACEL) 5-2-15.5 LF-MCG/0.5 Suspension; Inject 0.5 mL into the shoulder, thigh, or buttocks one time for 1 dose.  Dispense: 0.5 mL; Refill: 0  - Zoster Vac Recomb Adjuvanted (SHINGRIX) 50 MCG/0.5ML Recon Susp; Inject 0.5 mL into the shoulder, thigh, or buttocks one time for 1 dose.  Dispense: 0.5 mL; Refill: 0    2. Pulmonary emphysema, unspecified emphysema type (HCC)  - PULMONARY FUNCTION TESTS -Test requested: Complete Pulmonary Function Test; Future    3. Uncontrolled type 2 diabetes mellitus with hyperglycemia (HCC)  - Referral to Optometry  - HEMOGLOBIN A1C; Future    Other orders  - insulin glargine (LANTUS SOLOSTAR) 100 UNIT/ML Solution Pen-injector injection; Inject  under the skin every evening.      Followup: No follow-ups on file.           "

## 2023-04-17 ENCOUNTER — TELEPHONE (OUTPATIENT)
Dept: HEALTH INFORMATION MANAGEMENT | Facility: OTHER | Age: 68
End: 2023-04-17
Payer: COMMERCIAL

## 2023-06-01 ENCOUNTER — OFFICE VISIT (OUTPATIENT)
Dept: MEDICAL GROUP | Facility: PHYSICIAN GROUP | Age: 68
End: 2023-06-01
Payer: MEDICARE

## 2023-06-01 ENCOUNTER — APPOINTMENT (OUTPATIENT)
Dept: RADIOLOGY | Facility: IMAGING CENTER | Age: 68
End: 2023-06-01
Attending: FAMILY MEDICINE
Payer: MEDICARE

## 2023-06-01 VITALS
HEART RATE: 77 BPM | DIASTOLIC BLOOD PRESSURE: 82 MMHG | RESPIRATION RATE: 14 BRPM | HEIGHT: 73 IN | WEIGHT: 220 LBS | TEMPERATURE: 97.3 F | BODY MASS INDEX: 29.16 KG/M2 | SYSTOLIC BLOOD PRESSURE: 136 MMHG | OXYGEN SATURATION: 97 %

## 2023-06-01 DIAGNOSIS — R10.12 LEFT UPPER QUADRANT ABDOMINAL PAIN: ICD-10-CM

## 2023-06-01 DIAGNOSIS — Z77.098 HX OF AGENT ORANGE EXPOSURE: ICD-10-CM

## 2023-06-01 DIAGNOSIS — R06.2 WHEEZING: ICD-10-CM

## 2023-06-01 DIAGNOSIS — Z77.090 LONG TERM EXPOSURE TO ASBESTOS: ICD-10-CM

## 2023-06-01 DIAGNOSIS — E11.65 UNCONTROLLED TYPE 2 DIABETES MELLITUS WITH HYPERGLYCEMIA (HCC): ICD-10-CM

## 2023-06-01 DIAGNOSIS — E78.5 HYPERLIPIDEMIA, UNSPECIFIED HYPERLIPIDEMIA TYPE: ICD-10-CM

## 2023-06-01 DIAGNOSIS — J43.1 PANLOBULAR EMPHYSEMA (HCC): ICD-10-CM

## 2023-06-01 PROCEDURE — 71046 X-RAY EXAM CHEST 2 VIEWS: CPT | Mod: TC,FY | Performed by: FAMILY MEDICINE

## 2023-06-01 PROCEDURE — 99214 OFFICE O/P EST MOD 30 MIN: CPT | Performed by: FAMILY MEDICINE

## 2023-06-01 PROCEDURE — 3075F SYST BP GE 130 - 139MM HG: CPT | Performed by: FAMILY MEDICINE

## 2023-06-01 PROCEDURE — 3079F DIAST BP 80-89 MM HG: CPT | Performed by: FAMILY MEDICINE

## 2023-06-01 RX ORDER — LEVOTHYROXINE SODIUM 0.12 MG/1
125 TABLET ORAL
Qty: 90 TABLET | Refills: 3 | Status: SHIPPED | OUTPATIENT
Start: 2023-06-01 | End: 2024-03-19 | Stop reason: SDUPTHER

## 2023-06-01 RX ORDER — BUDESONIDE AND FORMOTEROL FUMARATE DIHYDRATE 80; 4.5 UG/1; UG/1
2 AEROSOL RESPIRATORY (INHALATION) 2 TIMES DAILY
Qty: 30.6 G | Refills: 3 | Status: SHIPPED | OUTPATIENT
Start: 2023-06-01 | End: 2024-03-19 | Stop reason: SDUPTHER

## 2023-06-01 RX ORDER — DULOXETIN HYDROCHLORIDE 60 MG/1
60 CAPSULE, DELAYED RELEASE ORAL DAILY
Qty: 90 CAPSULE | Refills: 3 | Status: SHIPPED | OUTPATIENT
Start: 2023-06-01 | End: 2024-03-19 | Stop reason: SDUPTHER

## 2023-06-01 RX ORDER — OMEPRAZOLE 20 MG/1
40 CAPSULE, DELAYED RELEASE ORAL DAILY
Qty: 180 CAPSULE | Refills: 3 | Status: SHIPPED | OUTPATIENT
Start: 2023-06-01 | End: 2024-03-19 | Stop reason: SDUPTHER

## 2023-06-01 RX ORDER — FENOFIBRATE 145 MG/1
145 TABLET, COATED ORAL DAILY
Qty: 90 TABLET | Refills: 3 | Status: SHIPPED | OUTPATIENT
Start: 2023-06-01 | End: 2024-03-19 | Stop reason: SDUPTHER

## 2023-06-01 RX ORDER — ATENOLOL 25 MG/1
25 TABLET ORAL DAILY
Qty: 90 TABLET | Refills: 3 | Status: SHIPPED | OUTPATIENT
Start: 2023-06-01 | End: 2024-03-19 | Stop reason: SDUPTHER

## 2023-06-01 RX ORDER — LOSARTAN POTASSIUM 25 MG/1
25 TABLET ORAL DAILY
Qty: 90 TABLET | Refills: 3 | Status: SHIPPED | OUTPATIENT
Start: 2023-06-01 | End: 2024-03-19 | Stop reason: SDUPTHER

## 2023-06-01 RX ORDER — TAMSULOSIN HYDROCHLORIDE 0.4 MG/1
0.4 CAPSULE ORAL
Qty: 90 CAPSULE | Refills: 3 | Status: SHIPPED | OUTPATIENT
Start: 2023-06-01 | End: 2024-03-19 | Stop reason: SDUPTHER

## 2023-06-01 RX ORDER — HYDROCHLOROTHIAZIDE 25 MG/1
25 TABLET ORAL DAILY
Qty: 90 TABLET | Refills: 3 | Status: SHIPPED | OUTPATIENT
Start: 2023-06-01 | End: 2024-03-19 | Stop reason: SDUPTHER

## 2023-06-01 RX ORDER — ROSUVASTATIN CALCIUM 10 MG/1
10 TABLET, COATED ORAL EVERY EVENING
Qty: 90 TABLET | Refills: 3 | Status: SHIPPED | OUTPATIENT
Start: 2023-06-01 | End: 2024-03-19 | Stop reason: SDUPTHER

## 2023-06-01 RX ORDER — ASPIRIN 81 MG/1
81 TABLET, CHEWABLE ORAL DAILY
Qty: 100 TABLET | Refills: 3 | Status: SHIPPED | OUTPATIENT
Start: 2023-06-01 | End: 2024-03-19 | Stop reason: SDUPTHER

## 2023-06-01 ASSESSMENT — FIBROSIS 4 INDEX: FIB4 SCORE: 1.06

## 2023-06-01 NOTE — ASSESSMENT & PLAN NOTE
Stable on symbicort.   Mild wheezing on exam today. He has 20 yrs hx of exposure to asbestos when he was an . Also hx of agent orange exposure.   cxr ordered today in clinic.

## 2023-06-01 NOTE — ASSESSMENT & PLAN NOTE
68 M with hx of hepatomegaly from fatty liver, hiatal hernia, DM2 on ozempic with worsening 2 months of LUQ abdominal pain and swelling.   He had more belching and constipation.   Had polyps removed, colonoscopy was done nov 2022 with 3 year recall.   He started ozempic August 2022.     Stat abd us ordered.     He has no weight loss, vomiting, diarrhea, blood in stool,   On exam no firm or pulsatile abdominal masses. Has mild TTP LUQ.

## 2023-06-01 NOTE — PROGRESS NOTES
Subjective:   Bryan Onofre is a 68 y.o. male here today for evaluation and management of:     Left upper quadrant abdominal pain  68 M with hx of hepatomegaly from fatty liver, hiatal hernia, DM2 on ozempic with worsening 2 months of LUQ abdominal pain and swelling.   He had more belching and constipation.   Had polyps removed, colonoscopy was done nov 2022 with 3 year recall.   He started ozempic August 2022.     Stat abd us ordered.     He has no weight loss, vomiting, diarrhea, blood in stool,   On exam no firm or pulsatile abdominal masses. Has mild TTP LUQ.       Chronic obstructive pulmonary disease (HCC)  Stable on symbicort.   Mild wheezing on exam today. He has 20 yrs hx of exposure to asbestos when he was an . Also hx of agent orange exposure.   cxr ordered today in clinic.              Current medicines (including changes today)  Current Outpatient Medications   Medication Sig Dispense Refill    tamsulosin (FLOMAX) 0.4 MG capsule Take 1 Capsule by mouth 1/2 hour after dinner. For prostate 90 Capsule 3    rosuvastatin (CRESTOR) 10 MG Tab Take 1 Tablet by mouth every evening. 90 Tablet 3    omeprazole (PRILOSEC) 20 MG delayed-release capsule Take 2 Capsules by mouth every day. For Vallecillo's esophagus 180 Capsule 3    metFORMIN (GLUCOPHAGE) 500 MG Tab Take 1 Tablet by mouth 3 times a day with meals. 270 Tablet 3    losartan (COZAAR) 25 MG Tab Take 1 Tablet by mouth every day. To protect kidneys 90 Tablet 3    levothyroxine (SYNTHROID) 125 MCG Tab Take 1 Tablet by mouth every morning on an empty stomach. 90 Tablet 3    hydroCHLOROthiazide (HYDRODIURIL) 25 MG Tab Take 1 Tablet by mouth every day. For high blood pressure 90 Tablet 3    fenofibrate (TRICOR) 145 MG Tab Take 1 Tablet by mouth every day. For high triglycerides 90 Tablet 3    DULoxetine (CYMBALTA) 60 MG Cap DR Particles delayed-release capsule Take 1 Capsule by mouth every day. For neuropathy pain 90 Capsule 3    budesonide-formoterol  "(SYMBICORT) 80-4.5 MCG/ACT Aerosol Inhale 2 Puffs 2 times a day. 30.6 g 3    atenolol (TENORMIN) 25 MG Tab Take 1 Tablet by mouth every day. For high blood pressure 90 Tablet 3    aspirin (ASA) 81 MG Chew Tab chewable tablet Chew 1 Tablet every day. 100 Tablet 3    insulin glargine (LANTUS SOLOSTAR) 100 UNIT/ML Solution Pen-injector injection Inject  under the skin every evening.      clobetasol (TEMOVATE) 0.05 % Cream Apply in a thin film to affected skin once a day for plaque psoriasis. 60 g 11    tacrolimus (PROTOPIC) 0.1 % Ointment Apply a thin film to affected skin twice a day for 6-8 week for plaque psoriasis. 100 g 3    Semaglutide,0.25 or 0.5MG/DOS, (OZEMPIC, 0.25 OR 0.5 MG/DOSE,) 2 MG/1.5ML Solution Pen-injector Inject 0.5 mg under the skin every 7 days. 3 mL 6    Aspirin 162.5 MG CAPSULE SR 24 HR ASPIRIN 81 MG ORAL TABLET      albuterol (PROVENTIL) 90 MCG/ACT Aero Soln Inhale 2 Puffs every 6 hours as needed for Shortness of Breath.       No current facility-administered medications for this visit.     He  has a past medical history of Anesthesia, Arthritis, Vallecillo esophagus, Breath shortness, COPD, Diabetes (12/30/09), Diabetic neuropathy (HCC), Elevated LFT's, GERD (gastroesophageal reflux disease), Heart burn, Hiatus hernia syndrome, Hyperlipidemia, Hypertension, Indigestion, Pain, Sleep apnea, and Unspecified disorder of thyroid.    ROS  No chest pain, no shortness of breath, no abdominal pain       Objective:     /82 (BP Location: Left arm, Patient Position: Sitting, BP Cuff Size: Adult)   Pulse 77   Temp 36.3 °C (97.3 °F) (Temporal)   Resp 14   Ht 1.854 m (6' 1\")   Wt 99.8 kg (220 lb)   SpO2 97%  Body mass index is 29.03 kg/m².   Physical Exam:  Constitutional: Alert, no distress.  Skin: Warm, dry, good turgor, no rashes in visible areas.  Eye: Equal, round and reactive, conjunctiva clear, lids normal.  ENMT: Lips without lesions, good dentition, oropharynx clear.  Neck: Trachea midline, " no masses, no thyromegaly. No cervical or supraclavicular lymphadenopathy  Respiratory: Unlabored respiratory effort, lungs clear to auscultation, no wheezes, no ronchi.  Cardiovascular: Normal S1, S2, no murmur, no edema.  Abdomen: Soft, non-tender, no masses, no hepatosplenomegaly.  Psych: Alert and oriented x3, normal affect and mood.        Assessment and Plan:   The following treatment plan was discussed    1. Left upper quadrant abdominal pain  - US-ABDOMEN COMPLETE SURVEY; Future    2. Uncontrolled type 2 diabetes mellitus with hyperglycemia (HCC)  - rosuvastatin (CRESTOR) 10 MG Tab; Take 1 Tablet by mouth every evening.  Dispense: 90 Tablet; Refill: 3    3. Hyperlipidemia, unspecified hyperlipidemia type  - rosuvastatin (CRESTOR) 10 MG Tab; Take 1 Tablet by mouth every evening.  Dispense: 90 Tablet; Refill: 3    4. Wheezing  - DX-CHEST-2 VIEWS; Future    5. Long term exposure to asbestos  - DX-CHEST-2 VIEWS; Future    6. Hx of agent Orange exposure  - DX-CHEST-2 VIEWS; Future    7. Panlobular emphysema (HCC)    Other orders  - tamsulosin (FLOMAX) 0.4 MG capsule; Take 1 Capsule by mouth 1/2 hour after dinner. For prostate  Dispense: 90 Capsule; Refill: 3  - omeprazole (PRILOSEC) 20 MG delayed-release capsule; Take 2 Capsules by mouth every day. For Vallecillo's esophagus  Dispense: 180 Capsule; Refill: 3  - metFORMIN (GLUCOPHAGE) 500 MG Tab; Take 1 Tablet by mouth 3 times a day with meals.  Dispense: 270 Tablet; Refill: 3  - losartan (COZAAR) 25 MG Tab; Take 1 Tablet by mouth every day. To protect kidneys  Dispense: 90 Tablet; Refill: 3  - levothyroxine (SYNTHROID) 125 MCG Tab; Take 1 Tablet by mouth every morning on an empty stomach.  Dispense: 90 Tablet; Refill: 3  - hydroCHLOROthiazide (HYDRODIURIL) 25 MG Tab; Take 1 Tablet by mouth every day. For high blood pressure  Dispense: 90 Tablet; Refill: 3  - fenofibrate (TRICOR) 145 MG Tab; Take 1 Tablet by mouth every day. For high triglycerides  Dispense: 90  Tablet; Refill: 3  - DULoxetine (CYMBALTA) 60 MG Cap DR Particles delayed-release capsule; Take 1 Capsule by mouth every day. For neuropathy pain  Dispense: 90 Capsule; Refill: 3  - budesonide-formoterol (SYMBICORT) 80-4.5 MCG/ACT Aerosol; Inhale 2 Puffs 2 times a day.  Dispense: 30.6 g; Refill: 3  - atenolol (TENORMIN) 25 MG Tab; Take 1 Tablet by mouth every day. For high blood pressure  Dispense: 90 Tablet; Refill: 3  - aspirin (ASA) 81 MG Chew Tab chewable tablet; Chew 1 Tablet every day.  Dispense: 100 Tablet; Refill: 3      Followup: Return for As Scheduled.

## 2023-06-02 ENCOUNTER — HOSPITAL ENCOUNTER (OUTPATIENT)
Dept: RADIOLOGY | Facility: MEDICAL CENTER | Age: 68
End: 2023-06-02
Attending: FAMILY MEDICINE
Payer: MEDICARE

## 2023-06-02 DIAGNOSIS — R10.12 LEFT UPPER QUADRANT ABDOMINAL PAIN: ICD-10-CM

## 2023-06-02 PROCEDURE — 76700 US EXAM ABDOM COMPLETE: CPT

## 2023-07-19 ENCOUNTER — HOSPITAL ENCOUNTER (OUTPATIENT)
Dept: LAB | Facility: MEDICAL CENTER | Age: 68
End: 2023-07-19
Attending: FAMILY MEDICINE
Payer: MEDICARE

## 2023-07-19 DIAGNOSIS — E11.65 UNCONTROLLED TYPE 2 DIABETES MELLITUS WITH HYPERGLYCEMIA (HCC): ICD-10-CM

## 2023-07-19 LAB
EST. AVERAGE GLUCOSE BLD GHB EST-MCNC: 194 MG/DL
HBA1C MFR BLD: 8.4 % (ref 4–5.6)

## 2023-07-19 PROCEDURE — 36415 COLL VENOUS BLD VENIPUNCTURE: CPT

## 2023-07-19 PROCEDURE — 83036 HEMOGLOBIN GLYCOSYLATED A1C: CPT

## 2023-09-23 ENCOUNTER — TELEPHONE (OUTPATIENT)
Dept: URGENT CARE | Facility: PHYSICIAN GROUP | Age: 68
End: 2023-09-23
Payer: COMMERCIAL

## 2023-10-04 DIAGNOSIS — E11.65 UNCONTROLLED TYPE 2 DIABETES MELLITUS WITH HYPERGLYCEMIA (HCC): ICD-10-CM

## 2023-11-03 ENCOUNTER — HOSPITAL ENCOUNTER (OUTPATIENT)
Dept: LAB | Facility: MEDICAL CENTER | Age: 68
End: 2023-11-03
Attending: STUDENT IN AN ORGANIZED HEALTH CARE EDUCATION/TRAINING PROGRAM
Payer: MEDICARE

## 2023-11-03 DIAGNOSIS — E11.65 UNCONTROLLED TYPE 2 DIABETES MELLITUS WITH HYPERGLYCEMIA (HCC): ICD-10-CM

## 2023-11-03 LAB
ALBUMIN SERPL BCP-MCNC: 4.6 G/DL (ref 3.2–4.9)
ALBUMIN/GLOB SERPL: 1.5 G/DL
ALP SERPL-CCNC: 53 U/L (ref 30–99)
ALT SERPL-CCNC: 43 U/L (ref 2–50)
ANION GAP SERPL CALC-SCNC: 12 MMOL/L (ref 7–16)
AST SERPL-CCNC: 47 U/L (ref 12–45)
BILIRUB SERPL-MCNC: 0.5 MG/DL (ref 0.1–1.5)
BUN SERPL-MCNC: 18 MG/DL (ref 8–22)
CALCIUM ALBUM COR SERPL-MCNC: 9.6 MG/DL (ref 8.5–10.5)
CALCIUM SERPL-MCNC: 10.1 MG/DL (ref 8.5–10.5)
CHLORIDE SERPL-SCNC: 96 MMOL/L (ref 96–112)
CO2 SERPL-SCNC: 25 MMOL/L (ref 20–33)
CREAT SERPL-MCNC: 1.05 MG/DL (ref 0.5–1.4)
CREAT UR-MCNC: 98.56 MG/DL
EST. AVERAGE GLUCOSE BLD GHB EST-MCNC: 200 MG/DL
GFR SERPLBLD CREATININE-BSD FMLA CKD-EPI: 76 ML/MIN/1.73 M 2
GLOBULIN SER CALC-MCNC: 3.1 G/DL (ref 1.9–3.5)
GLUCOSE SERPL-MCNC: 228 MG/DL (ref 65–99)
HBA1C MFR BLD: 8.6 % (ref 4–5.6)
MICROALBUMIN UR-MCNC: 1.9 MG/DL
MICROALBUMIN/CREAT UR: 19 MG/G (ref 0–30)
POTASSIUM SERPL-SCNC: 4.4 MMOL/L (ref 3.6–5.5)
PROT SERPL-MCNC: 7.7 G/DL (ref 6–8.2)
SODIUM SERPL-SCNC: 133 MMOL/L (ref 135–145)

## 2023-11-03 PROCEDURE — 36415 COLL VENOUS BLD VENIPUNCTURE: CPT

## 2023-11-03 PROCEDURE — 82043 UR ALBUMIN QUANTITATIVE: CPT

## 2023-11-03 PROCEDURE — 83036 HEMOGLOBIN GLYCOSYLATED A1C: CPT | Mod: GA

## 2023-11-03 PROCEDURE — 82570 ASSAY OF URINE CREATININE: CPT

## 2023-11-03 PROCEDURE — 80053 COMPREHEN METABOLIC PANEL: CPT

## 2023-11-10 ENCOUNTER — TELEPHONE (OUTPATIENT)
Dept: MEDICAL GROUP | Facility: PHYSICIAN GROUP | Age: 68
End: 2023-11-10

## 2023-11-10 ENCOUNTER — OFFICE VISIT (OUTPATIENT)
Dept: MEDICAL GROUP | Facility: PHYSICIAN GROUP | Age: 68
End: 2023-11-10
Payer: MEDICARE

## 2023-11-10 VITALS
SYSTOLIC BLOOD PRESSURE: 120 MMHG | WEIGHT: 230 LBS | DIASTOLIC BLOOD PRESSURE: 82 MMHG | BODY MASS INDEX: 30.48 KG/M2 | TEMPERATURE: 97.4 F | HEART RATE: 70 BPM | OXYGEN SATURATION: 95 % | RESPIRATION RATE: 18 BRPM | HEIGHT: 73 IN

## 2023-11-10 DIAGNOSIS — E11.65 UNCONTROLLED TYPE 2 DIABETES MELLITUS WITH HYPERGLYCEMIA (HCC): ICD-10-CM

## 2023-11-10 DIAGNOSIS — Z12.5 SCREENING FOR MALIGNANT NEOPLASM OF PROSTATE: ICD-10-CM

## 2023-11-10 DIAGNOSIS — E03.9 HYPOTHYROIDISM, UNSPECIFIED TYPE: ICD-10-CM

## 2023-11-10 PROCEDURE — 3074F SYST BP LT 130 MM HG: CPT | Performed by: FAMILY MEDICINE

## 2023-11-10 PROCEDURE — 3079F DIAST BP 80-89 MM HG: CPT | Performed by: FAMILY MEDICINE

## 2023-11-10 PROCEDURE — 99214 OFFICE O/P EST MOD 30 MIN: CPT | Performed by: FAMILY MEDICINE

## 2023-11-10 RX ORDER — SEMAGLUTIDE 1.34 MG/ML
0.5 INJECTION, SOLUTION SUBCUTANEOUS
Qty: 3 ML | Refills: 6 | Status: CANCELLED | OUTPATIENT
Start: 2023-11-10

## 2023-11-10 RX ORDER — AMOXICILLIN 500 MG/1
CAPSULE ORAL
COMMUNITY
Start: 2023-09-06 | End: 2023-11-10

## 2023-11-10 RX ORDER — HYDROCODONE BITARTRATE AND ACETAMINOPHEN 5; 325 MG/1; MG/1
1 TABLET ORAL EVERY 6 HOURS PRN
COMMUNITY
Start: 2023-09-06 | End: 2023-11-10

## 2023-11-10 ASSESSMENT — FIBROSIS 4 INDEX: FIB4 SCORE: 2.06

## 2023-11-10 NOTE — PATIENT INSTRUCTIONS
Please get fasting labs, fasting for 8-10 hours before next visit. You can make an appointment for the lab or walk in.   Lab hours [facility] location: Monday to Friday 6 am - 4 pm, Saturday 7 am -noon  Even if you lose your lab paperwork, you can still come in to get your lab done.

## 2023-11-10 NOTE — ASSESSMENT & PLAN NOTE
Uncontrolled, A1c >8  Increase ozempic to 1 mg/week  He had teeth pulled so has been on liquid diet and gained weight.   A1c:   Lab Results   Component Value Date/Time    HBA1C 8.6 (H) 11/03/2023 0639    AVGLUC 200 11/03/2023 0639     Lipids:   Lab Results   Component Value Date/Time    CHOLSTRLTOT 121 04/07/2023 07:23 AM    TRIGLYCERIDE 274 (H) 04/07/2023 07:23 AM    HDL 31 (A) 04/07/2023 07:23 AM    LDL 35 04/07/2023 07:23 AM   ]  BMP:   Lab Results   Component Value Date/Time    SODIUM 133 (L) 11/03/2023 0639    POTASSIUM 4.4 11/03/2023 0639    CHLORIDE 96 11/03/2023 0639    CO2 25 11/03/2023 0639    GLUCOSE 228 (H) 11/03/2023 0639    BUN 18 11/03/2023 0639    CREATININE 1.05 11/03/2023 0639    CALCIUM 10.1 11/03/2023 0639    ANION 12.0 11/03/2023 0639     GFR:   Lab Results   Component Value Date/Time    IF[place] >60 08/28/2015 1042    IFNOTAFR >60 08/28/2015 1042     Last eye exam: up to date  Foot exam: decreased sensation to monofilament testing b/l, dry skin and callus, no ulcers or deformities.   Medications: ozempic, rosuvastatin, metformin, asa, losartan

## 2023-11-10 NOTE — TELEPHONE ENCOUNTER
DOCUMENTATION OF PAR STATUS:    Bryan Onofre (Key: [id number])  Ozempic (1 MG/DOSE) 4MG/3ML pen-injectors  Form  [facility]  Created  19 minutes ago  Sent to Plan  11 10 2023  14 minutes ago  Plan Response  14 minutes ago  Submit Clinical Questions  4 minutes ago  Determination  Wait for Determination

## 2023-11-10 NOTE — PROGRESS NOTES
Subjective:   Bryan Onofre is a 68 y.o. male here today for evaluation and management of:     Uncontrolled type 2 diabetes mellitus with hyperglycemia (HCC)  Uncontrolled, A1c >8  Increase ozempic to 1 mg/week  He had teeth pulled so has been on liquid diet and gained weight.   A1c:   Lab Results   Component Value Date/Time    HBA1C 8.6 (H) 11/03/2023 0639    AVGLUC 200 11/03/2023 0639     Lipids:   Lab Results   Component Value Date/Time    CHOLSTRLTOT 121 04/07/2023 07:23 AM    TRIGLYCERIDE 274 (H) 04/07/2023 07:23 AM    HDL 31 (A) 04/07/2023 07:23 AM    LDL 35 04/07/2023 07:23 AM   ]  BMP:   Lab Results   Component Value Date/Time    SODIUM 133 (L) 11/03/2023 0639    POTASSIUM 4.4 11/03/2023 0639    CHLORIDE 96 11/03/2023 0639    CO2 25 11/03/2023 0639    GLUCOSE 228 (H) 11/03/2023 0639    BUN 18 11/03/2023 0639    CREATININE 1.05 11/03/2023 0639    CALCIUM 10.1 11/03/2023 0639    ANION 12.0 11/03/2023 0639     GFR:   Lab Results   Component Value Date/Time    IF[place] >60 08/28/2015 1042    IFNOTAFR >60 08/28/2015 1042     Last eye exam: up to date  Foot exam: decreased sensation to monofilament testing b/l, dry skin and callus, no ulcers or deformities.   Medications: ozempic, rosuvastatin, metformin, asa, losartan               Current medicines (including changes today)  Current Outpatient Medications   Medication Sig Dispense Refill    Semaglutide, 1 MG/DOSE, 4 MG/3ML Solution Pen-injector Inject 1 mg under the skin every 7 days. 9 mL 3    tamsulosin (FLOMAX) 0.4 MG capsule Take 1 Capsule by mouth 1/2 hour after dinner. For prostate 90 Capsule 3    rosuvastatin (CRESTOR) 10 MG Tab Take 1 Tablet by mouth every evening. 90 Tablet 3    omeprazole (PRILOSEC) 20 MG delayed-release capsule Take 2 Capsules by mouth every day. For Vallecillo's esophagus 180 Capsule 3    metFORMIN (GLUCOPHAGE) 500 MG Tab Take 1 Tablet by mouth 3 times a day with meals. (Patient taking differently: Take 1,000 mg by mouth 2 times a  day with meals.) 270 Tablet 3    losartan (COZAAR) 25 MG Tab Take 1 Tablet by mouth every day. To protect kidneys 90 Tablet 3    levothyroxine (SYNTHROID) 125 MCG Tab Take 1 Tablet by mouth every morning on an empty stomach. 90 Tablet 3    hydroCHLOROthiazide (HYDRODIURIL) 25 MG Tab Take 1 Tablet by mouth every day. For high blood pressure 90 Tablet 3    fenofibrate (TRICOR) 145 MG Tab Take 1 Tablet by mouth every day. For high triglycerides 90 Tablet 3    DULoxetine (CYMBALTA) 60 MG Cap DR Particles delayed-release capsule Take 1 Capsule by mouth every day. For neuropathy pain 90 Capsule 3    budesonide-formoterol (SYMBICORT) 80-4.5 MCG/ACT Aerosol Inhale 2 Puffs 2 times a day. 30.6 g 3    atenolol (TENORMIN) 25 MG Tab Take 1 Tablet by mouth every day. For high blood pressure 90 Tablet 3    insulin glargine (LANTUS SOLOSTAR) 100 UNIT/ML Solution Pen-injector injection Inject  under the skin every evening.      clobetasol (TEMOVATE) 0.05 % Cream Apply in a thin film to affected skin once a day for plaque psoriasis. 60 g 11    tacrolimus (PROTOPIC) 0.1 % Ointment Apply a thin film to affected skin twice a day for 6-8 week for plaque psoriasis. 100 g 3    Semaglutide,0.25 or 0.5MG/DOS, (OZEMPIC, 0.25 OR 0.5 MG/DOSE,) 2 MG/1.5ML Solution Pen-injector Inject 0.5 mg under the skin every 7 days. 3 mL 6    albuterol (PROVENTIL) 90 MCG/ACT Aero Soln Inhale 2 Puffs every 6 hours as needed for Shortness of Breath.      aspirin (ASA) 81 MG Chew Tab chewable tablet Chew 1 Tablet every day. 100 Tablet 3     No current facility-administered medications for this visit.     He  has a past medical history of Anesthesia, Arthritis, Vallecillo esophagus, Breath shortness, COPD, Diabetes (12/30/09), Diabetic neuropathy (HCC), Elevated LFT's, GERD (gastroesophageal reflux disease), Heart burn, Hiatus hernia syndrome, Hyperlipidemia, Hypertension, Indigestion, Pain, Sleep apnea, and Unspecified disorder of thyroid.    ROS  No chest pain, no  "shortness of breath, no abdominal pain       Objective:     /82 (BP Location: Left arm, Patient Position: Sitting, BP Cuff Size: Large adult)   Pulse 70   Temp 36.3 °C (97.4 °F) (Temporal)   Resp 18   Ht 1.854 m (6' 1\")   Wt 104 kg (230 lb)   SpO2 95%  Body mass index is 30.34 kg/m².   Physical Exam:  Constitutional: Alert, no distress.  Skin: Warm, dry, good turgor, no rashes in visible areas.  Eye: Equal, round and reactive, conjunctiva clear, lids normal.  ENMT: Lips without lesions, good dentition, oropharynx clear.  Neck: Trachea midline, no masses, no thyromegaly. No cervical or supraclavicular lymphadenopathy  Respiratory: Unlabored respiratory effort, lungs clear to auscultation, no wheezes, no ronchi.  Cardiovascular: Normal S1, S2, no murmur, no edema.  Abdomen: Soft, non-tender, no masses, no hepatosplenomegaly.  Psych: Alert and oriented x3, normal affect and mood.        Assessment and Plan:   The following treatment plan was discussed    1. Uncontrolled type 2 diabetes mellitus with hyperglycemia (HCC)  - Hemoglobin A1c; Future  - Diabetic Monofilament Lower Extremity Exam  - Basic Metabolic Panel; Future    2. Hypothyroidism, unspecified type  - TSH WITH REFLEX TO FT4; Future    3. Screening for malignant neoplasm of prostate  - PROSTATE SPECIFIC AG SCREENING; Future    Other orders  - Semaglutide, 1 MG/DOSE, 4 MG/3ML Solution Pen-injector; Inject 1 mg under the skin every 7 days.  Dispense: 9 mL; Refill: 3      Followup: Return in about 3 months (around 2/10/2024).           "

## 2024-03-19 DIAGNOSIS — E11.65 UNCONTROLLED TYPE 2 DIABETES MELLITUS WITH HYPERGLYCEMIA (HCC): ICD-10-CM

## 2024-03-19 DIAGNOSIS — E78.5 HYPERLIPIDEMIA, UNSPECIFIED HYPERLIPIDEMIA TYPE: ICD-10-CM

## 2024-03-19 NOTE — TELEPHONE ENCOUNTER
Requested Prescriptions     Pending Prescriptions Disp Refills    tamsulosin (FLOMAX) 0.4 MG capsule 90 Capsule 3     Sig: Take 1 Capsule by mouth 1/2 hour after dinner. For prostate    rosuvastatin (CRESTOR) 10 MG Tab 90 Tablet 3     Sig: Take 1 Tablet by mouth every evening.    omeprazole (PRILOSEC) 20 MG delayed-release capsule 180 Capsule 3     Sig: Take 2 Capsules by mouth every day. For Vallecillo's esophagus    metFORMIN (GLUCOPHAGE) 500 MG Tab 270 Tablet 3     Sig: Take 1 Tablet by mouth 3 times a day with meals.    losartan (COZAAR) 25 MG Tab 90 Tablet 3     Sig: Take 1 Tablet by mouth every day. To protect kidneys    levothyroxine (SYNTHROID) 125 MCG Tab 90 Tablet 3     Sig: Take 1 Tablet by mouth every morning on an empty stomach.    hydroCHLOROthiazide 25 MG Tab 90 Tablet 3     Sig: Take 1 Tablet by mouth every day. For high blood pressure    fenofibrate (TRICOR) 145 MG Tab 90 Tablet 3     Sig: Take 1 Tablet by mouth every day. For high triglycerides    DULoxetine (CYMBALTA) 60 MG Cap DR Particles delayed-release capsule 90 Capsule 3     Sig: Take 1 Capsule by mouth every day. For neuropathy pain    budesonide-formoterol (SYMBICORT) 80-4.5 MCG/ACT Aerosol 30.6 g 3     Sig: Inhale 2 Puffs 2 times a day.    atenolol (TENORMIN) 25 MG Tab 90 Tablet 3     Sig: Take 1 Tablet by mouth every day. For high blood pressure    aspirin (ASA) 81 MG Chew Tab chewable tablet 100 Tablet 3     Sig: Chew 1 Tablet every day.      Last office visit: 11/10/23  Last lab: 11/3/23

## 2024-03-20 RX ORDER — ASPIRIN 81 MG/1
81 TABLET, CHEWABLE ORAL DAILY
Qty: 100 TABLET | Refills: 3 | Status: SHIPPED | OUTPATIENT
Start: 2024-03-20

## 2024-03-20 RX ORDER — ROSUVASTATIN CALCIUM 10 MG/1
10 TABLET, COATED ORAL EVERY EVENING
Qty: 90 TABLET | Refills: 3 | Status: SHIPPED | OUTPATIENT
Start: 2024-03-20

## 2024-03-20 RX ORDER — BUDESONIDE AND FORMOTEROL FUMARATE DIHYDRATE 80; 4.5 UG/1; UG/1
2 AEROSOL RESPIRATORY (INHALATION) 2 TIMES DAILY
Qty: 30.6 G | Refills: 3 | Status: SHIPPED | OUTPATIENT
Start: 2024-03-20

## 2024-03-20 RX ORDER — DULOXETIN HYDROCHLORIDE 60 MG/1
60 CAPSULE, DELAYED RELEASE ORAL DAILY
Qty: 90 CAPSULE | Refills: 3 | Status: SHIPPED | OUTPATIENT
Start: 2024-03-20

## 2024-03-20 RX ORDER — HYDROCHLOROTHIAZIDE 25 MG/1
25 TABLET ORAL DAILY
Qty: 90 TABLET | Refills: 3 | Status: SHIPPED | OUTPATIENT
Start: 2024-03-20

## 2024-03-20 RX ORDER — LOSARTAN POTASSIUM 25 MG/1
25 TABLET ORAL DAILY
Qty: 90 TABLET | Refills: 3 | Status: SHIPPED | OUTPATIENT
Start: 2024-03-20

## 2024-03-20 RX ORDER — ATENOLOL 25 MG/1
25 TABLET ORAL DAILY
Qty: 90 TABLET | Refills: 3 | Status: SHIPPED | OUTPATIENT
Start: 2024-03-20

## 2024-03-20 RX ORDER — TAMSULOSIN HYDROCHLORIDE 0.4 MG/1
0.4 CAPSULE ORAL
Qty: 90 CAPSULE | Refills: 3 | Status: SHIPPED | OUTPATIENT
Start: 2024-03-20

## 2024-03-20 RX ORDER — OMEPRAZOLE 20 MG/1
40 CAPSULE, DELAYED RELEASE ORAL DAILY
Qty: 180 CAPSULE | Refills: 3 | Status: SHIPPED | OUTPATIENT
Start: 2024-03-20

## 2024-03-20 RX ORDER — LEVOTHYROXINE SODIUM 0.12 MG/1
125 TABLET ORAL
Qty: 90 TABLET | Refills: 3 | Status: SHIPPED | OUTPATIENT
Start: 2024-03-20

## 2024-03-20 RX ORDER — FENOFIBRATE 145 MG/1
145 TABLET, COATED ORAL DAILY
Qty: 90 TABLET | Refills: 3 | Status: SHIPPED | OUTPATIENT
Start: 2024-03-20

## 2024-04-08 ENCOUNTER — HOSPITAL ENCOUNTER (OUTPATIENT)
Dept: LAB | Facility: MEDICAL CENTER | Age: 69
End: 2024-04-08
Attending: FAMILY MEDICINE
Payer: MEDICARE

## 2024-04-08 DIAGNOSIS — Z12.5 SCREENING FOR MALIGNANT NEOPLASM OF PROSTATE: ICD-10-CM

## 2024-04-08 DIAGNOSIS — E11.65 UNCONTROLLED TYPE 2 DIABETES MELLITUS WITH HYPERGLYCEMIA (HCC): ICD-10-CM

## 2024-04-08 DIAGNOSIS — E03.9 HYPOTHYROIDISM, UNSPECIFIED TYPE: ICD-10-CM

## 2024-04-08 LAB
ANION GAP SERPL CALC-SCNC: 12 MMOL/L (ref 7–16)
BUN SERPL-MCNC: 17 MG/DL (ref 8–22)
CALCIUM SERPL-MCNC: 9.9 MG/DL (ref 8.5–10.5)
CHLORIDE SERPL-SCNC: 94 MMOL/L (ref 96–112)
CO2 SERPL-SCNC: 27 MMOL/L (ref 20–33)
CREAT SERPL-MCNC: 1.04 MG/DL (ref 0.5–1.4)
EST. AVERAGE GLUCOSE BLD GHB EST-MCNC: 232 MG/DL
GFR SERPLBLD CREATININE-BSD FMLA CKD-EPI: 77 ML/MIN/1.73 M 2
GLUCOSE SERPL-MCNC: 276 MG/DL (ref 65–99)
HBA1C MFR BLD: 9.7 % (ref 4–5.6)
POTASSIUM SERPL-SCNC: 4.4 MMOL/L (ref 3.6–5.5)
PSA SERPL-MCNC: 0.44 NG/ML (ref 0–4)
SODIUM SERPL-SCNC: 133 MMOL/L (ref 135–145)
TSH SERPL DL<=0.005 MIU/L-ACNC: 1.94 UIU/ML (ref 0.38–5.33)

## 2024-04-08 PROCEDURE — 84153 ASSAY OF PSA TOTAL: CPT | Mod: GA

## 2024-04-08 PROCEDURE — 36415 COLL VENOUS BLD VENIPUNCTURE: CPT

## 2024-04-08 PROCEDURE — 80048 BASIC METABOLIC PNL TOTAL CA: CPT

## 2024-04-08 PROCEDURE — 83036 HEMOGLOBIN GLYCOSYLATED A1C: CPT | Mod: GA

## 2024-04-08 PROCEDURE — 84443 ASSAY THYROID STIM HORMONE: CPT

## 2024-04-16 ENCOUNTER — APPOINTMENT (OUTPATIENT)
Dept: MEDICAL GROUP | Facility: PHYSICIAN GROUP | Age: 69
End: 2024-04-16
Payer: MEDICARE

## 2024-04-16 VITALS
DIASTOLIC BLOOD PRESSURE: 78 MMHG | OXYGEN SATURATION: 97 % | BODY MASS INDEX: 30.62 KG/M2 | SYSTOLIC BLOOD PRESSURE: 122 MMHG | RESPIRATION RATE: 14 BRPM | HEART RATE: 78 BPM | TEMPERATURE: 97.6 F | HEIGHT: 73 IN | WEIGHT: 231 LBS

## 2024-04-16 DIAGNOSIS — E78.5 HYPERLIPIDEMIA, UNSPECIFIED HYPERLIPIDEMIA TYPE: ICD-10-CM

## 2024-04-16 DIAGNOSIS — E11.65 UNCONTROLLED TYPE 2 DIABETES MELLITUS WITH HYPERGLYCEMIA (HCC): ICD-10-CM

## 2024-04-16 DIAGNOSIS — E11.40 DIABETIC NEUROPATHY (HCC): ICD-10-CM

## 2024-04-16 LAB — RETINAL SCREEN: NORMAL

## 2024-04-16 PROCEDURE — 3074F SYST BP LT 130 MM HG: CPT | Performed by: FAMILY MEDICINE

## 2024-04-16 PROCEDURE — 99214 OFFICE O/P EST MOD 30 MIN: CPT | Performed by: FAMILY MEDICINE

## 2024-04-16 PROCEDURE — 3078F DIAST BP <80 MM HG: CPT | Performed by: FAMILY MEDICINE

## 2024-04-16 RX ORDER — GLIMEPIRIDE 2 MG/1
2 TABLET ORAL EVERY MORNING
Qty: 90 TABLET | Refills: 3 | Status: SHIPPED | OUTPATIENT
Start: 2024-04-16

## 2024-04-16 RX ORDER — SEMAGLUTIDE 1.34 MG/ML
2 INJECTION, SOLUTION SUBCUTANEOUS
Qty: 18 ML | Refills: 3 | Status: SHIPPED | OUTPATIENT
Start: 2024-04-16

## 2024-04-16 RX ORDER — INSULIN GLARGINE 100 [IU]/ML
10 INJECTION, SOLUTION SUBCUTANEOUS EVERY EVENING
Qty: 9 ML | Refills: 3 | Status: SHIPPED | OUTPATIENT
Start: 2024-04-16

## 2024-04-16 ASSESSMENT — PATIENT HEALTH QUESTIONNAIRE - PHQ9: CLINICAL INTERPRETATION OF PHQ2 SCORE: 0

## 2024-04-16 ASSESSMENT — FIBROSIS 4 INDEX: FIB4 SCORE: 2.09

## 2024-04-16 NOTE — ASSESSMENT & PLAN NOTE
A1c 9.7  He has been working on diet and no changes to physical activity.   Is on metformin 1000 bid and ozempic 1 mg /week. he did not start lantus 10 units qhs.     Has no chest pain, normal urine microalbumin, mild chronic elevated ast from NAFLD, LDL35.   On rosuvastatin, losartan.     Inc ozempic to 2mg, he has no N/V. Mild constipation BM every 3 days.   Add glimepiride 2 mg stop if BS drops below 90  Start lantus 10 units and dec to 5 if blood sugars below 90.

## 2024-04-16 NOTE — PROGRESS NOTES
Subjective:   Bryan Onofre is a 69 y.o. male here today for evaluation and management of:     Uncontrolled type 2 diabetes mellitus with hyperglycemia (HCC)  A1c 9.7  He has been working on diet and no changes to physical activity.   Is on metformin 1000 bid and ozempic 1 mg /week. he did not start lantus 10 units qhs.     Has no chest pain, normal urine microalbumin, mild chronic elevated ast from NAFLD, LDL35.   On rosuvastatin, losartan.     Inc ozempic to 2mg, he has no N/V. Mild constipation BM every 3 days.   Add glimepiride 2 mg stop if BS drops below 90  Start lantus 10 units and dec to 5 if blood sugars below 90.          Current medicines (including changes today)  Current Outpatient Medications   Medication Sig Dispense Refill    Semaglutide, 1 MG/DOSE, (OZEMPIC, 1 MG/DOSE,) 4 MG/3ML Solution Pen-injector Inject 2 mg under the skin every 7 days. For uncontrolled DM2, A1c >9 18 mL 3    glimepiride (AMARYL) 2 MG Tab Take 1 Tablet by mouth every morning. For uncontrolled diabetes. Stop if blood sugars drop below 90. 90 Tablet 3    insulin glargine (LANTUS SOLOSTAR) 100 UNIT/ML Solution Pen-injector injection Inject 10 Units under the skin every evening. 9 mL 3    tamsulosin (FLOMAX) 0.4 MG capsule Take 1 Capsule by mouth 1/2 hour after dinner. For prostate 90 Capsule 3    rosuvastatin (CRESTOR) 10 MG Tab Take 1 Tablet by mouth every evening. 90 Tablet 3    omeprazole (PRILOSEC) 20 MG delayed-release capsule Take 2 Capsules by mouth every day. For Vallecillo's esophagus 180 Capsule 3    metFORMIN (GLUCOPHAGE) 500 MG Tab Take 1 Tablet by mouth 3 times a day with meals. 270 Tablet 3    losartan (COZAAR) 25 MG Tab Take 1 Tablet by mouth every day. To protect kidneys 90 Tablet 3    levothyroxine (SYNTHROID) 125 MCG Tab Take 1 Tablet by mouth every morning on an empty stomach. 90 Tablet 3    hydroCHLOROthiazide 25 MG Tab Take 1 Tablet by mouth every day. For high blood pressure 90 Tablet 3    fenofibrate  "(TRICOR) 145 MG Tab Take 1 Tablet by mouth every day. For high triglycerides 90 Tablet 3    DULoxetine (CYMBALTA) 60 MG Cap DR Particles delayed-release capsule Take 1 Capsule by mouth every day. For neuropathy pain 90 Capsule 3    budesonide-formoterol (SYMBICORT) 80-4.5 MCG/ACT Aerosol Inhale 2 Puffs 2 times a day. 30.6 g 3    atenolol (TENORMIN) 25 MG Tab Take 1 Tablet by mouth every day. For high blood pressure 90 Tablet 3    aspirin (ASA) 81 MG Chew Tab chewable tablet Chew 1 Tablet every day. 100 Tablet 3    clobetasol (TEMOVATE) 0.05 % Cream Apply in a thin film to affected skin once a day for plaque psoriasis. 60 g 11    tacrolimus (PROTOPIC) 0.1 % Ointment Apply a thin film to affected skin twice a day for 6-8 week for plaque psoriasis. 100 g 3    albuterol (PROVENTIL) 90 MCG/ACT Aero Soln Inhale 2 Puffs every 6 hours as needed for Shortness of Breath.       No current facility-administered medications for this visit.     He  has a past medical history of Anesthesia, Arthritis, Vallecillo esophagus, Breath shortness, COPD, Diabetes (12/30/09), Diabetic neuropathy (HCC), Elevated LFT's, GERD (gastroesophageal reflux disease), Heart burn, Hiatus hernia syndrome, Hyperlipidemia, Hypertension, Indigestion, Pain, Sleep apnea, and Unspecified disorder of thyroid.    ROS  No chest pain, no shortness of breath, no abdominal pain       Objective:     /78   Pulse 78   Temp 36.4 °C (97.6 °F) (Temporal)   Resp 14   Ht 1.854 m (6' 1\")   Wt 105 kg (231 lb)   SpO2 97%  Body mass index is 30.48 kg/m².   Physical Exam:  Constitutional: Alert, no distress.  Skin: Warm, dry, good turgor, no rashes in visible areas.  Eye: Equal, round and reactive, conjunctiva clear, lids normal.  ENMT: Lips without lesions, good dentition, oropharynx clear.  Neck: Trachea midline, no masses, no thyromegaly. No cervical or supraclavicular lymphadenopathy  Respiratory: Unlabored respiratory effort, lungs clear to auscultation, no " wheezes, no ronchi.  Cardiovascular: Normal S1, S2, no murmur, no edema.  Abdomen: Soft, non-tender, no masses, no hepatosplenomegaly.  Psych: Alert and oriented x3, normal affect and mood.    Assessment and Plan:   The following treatment plan was discussed    1. Diabetic neuropathy (HCC)  - POCT Retinal Eye Exam    2. Uncontrolled type 2 diabetes mellitus with hyperglycemia (HCC)  - Semaglutide, 1 MG/DOSE, (OZEMPIC, 1 MG/DOSE,) 4 MG/3ML Solution Pen-injector; Inject 2 mg under the skin every 7 days. For uncontrolled DM2, A1c >9  Dispense: 18 mL; Refill: 3  - HEMOGLOBIN A1C; Future    3. Hyperlipidemia, unspecified hyperlipidemia type  - Lipid Profile; Future  - Comp Metabolic Panel; Future    Other orders  - glimepiride (AMARYL) 2 MG Tab; Take 1 Tablet by mouth every morning. For uncontrolled diabetes. Stop if blood sugars drop below 90.  Dispense: 90 Tablet; Refill: 3  - insulin glargine (LANTUS SOLOSTAR) 100 UNIT/ML Solution Pen-injector injection; Inject 10 Units under the skin every evening.  Dispense: 9 mL; Refill: 3      Followup: Return in about 3 months (around 7/16/2024) for Diabetes, Lab Review.

## 2024-06-24 SDOH — ECONOMIC STABILITY: HOUSING INSECURITY: IN THE LAST 12 MONTHS, HOW MANY PLACES HAVE YOU LIVED?: 1

## 2024-06-24 SDOH — ECONOMIC STABILITY: FOOD INSECURITY: WITHIN THE PAST 12 MONTHS, THE FOOD YOU BOUGHT JUST DIDN'T LAST AND YOU DIDN'T HAVE MONEY TO GET MORE.: SOMETIMES TRUE

## 2024-06-24 SDOH — ECONOMIC STABILITY: TRANSPORTATION INSECURITY
IN THE PAST 12 MONTHS, HAS THE LACK OF TRANSPORTATION KEPT YOU FROM MEDICAL APPOINTMENTS OR FROM GETTING MEDICATIONS?: NO

## 2024-06-24 SDOH — ECONOMIC STABILITY: INCOME INSECURITY: HOW HARD IS IT FOR YOU TO PAY FOR THE VERY BASICS LIKE FOOD, HOUSING, MEDICAL CARE, AND HEATING?: NOT VERY HARD

## 2024-06-24 SDOH — ECONOMIC STABILITY: HOUSING INSECURITY
IN THE LAST 12 MONTHS, WAS THERE A TIME WHEN YOU DID NOT HAVE A STEADY PLACE TO SLEEP OR SLEPT IN A SHELTER (INCLUDING NOW)?: NO

## 2024-06-24 SDOH — ECONOMIC STABILITY: INCOME INSECURITY: IN THE LAST 12 MONTHS, WAS THERE A TIME WHEN YOU WERE NOT ABLE TO PAY THE MORTGAGE OR RENT ON TIME?: NO

## 2024-06-24 SDOH — HEALTH STABILITY: PHYSICAL HEALTH: ON AVERAGE, HOW MANY MINUTES DO YOU ENGAGE IN EXERCISE AT THIS LEVEL?: 0 MIN

## 2024-06-24 SDOH — HEALTH STABILITY: MENTAL HEALTH
STRESS IS WHEN SOMEONE FEELS TENSE, NERVOUS, ANXIOUS, OR CAN'T SLEEP AT NIGHT BECAUSE THEIR MIND IS TROUBLED. HOW STRESSED ARE YOU?: ONLY A LITTLE

## 2024-06-24 SDOH — ECONOMIC STABILITY: FOOD INSECURITY: WITHIN THE PAST 12 MONTHS, YOU WORRIED THAT YOUR FOOD WOULD RUN OUT BEFORE YOU GOT MONEY TO BUY MORE.: NEVER TRUE

## 2024-06-24 SDOH — ECONOMIC STABILITY: TRANSPORTATION INSECURITY
IN THE PAST 12 MONTHS, HAS LACK OF TRANSPORTATION KEPT YOU FROM MEETINGS, WORK, OR FROM GETTING THINGS NEEDED FOR DAILY LIVING?: NO

## 2024-06-24 SDOH — ECONOMIC STABILITY: TRANSPORTATION INSECURITY
IN THE PAST 12 MONTHS, HAS LACK OF RELIABLE TRANSPORTATION KEPT YOU FROM MEDICAL APPOINTMENTS, MEETINGS, WORK OR FROM GETTING THINGS NEEDED FOR DAILY LIVING?: NO

## 2024-06-24 SDOH — HEALTH STABILITY: PHYSICAL HEALTH: ON AVERAGE, HOW MANY DAYS PER WEEK DO YOU ENGAGE IN MODERATE TO STRENUOUS EXERCISE (LIKE A BRISK WALK)?: 0 DAYS

## 2024-06-24 ASSESSMENT — SOCIAL DETERMINANTS OF HEALTH (SDOH)
DO YOU BELONG TO ANY CLUBS OR ORGANIZATIONS SUCH AS CHURCH GROUPS UNIONS, FRATERNAL OR ATHLETIC GROUPS, OR SCHOOL GROUPS?: NO
HOW MANY DRINKS CONTAINING ALCOHOL DO YOU HAVE ON A TYPICAL DAY WHEN YOU ARE DRINKING: PATIENT DOES NOT DRINK
HOW OFTEN DO YOU GET TOGETHER WITH FRIENDS OR RELATIVES?: ONCE A WEEK
HOW HARD IS IT FOR YOU TO PAY FOR THE VERY BASICS LIKE FOOD, HOUSING, MEDICAL CARE, AND HEATING?: NOT VERY HARD
HOW OFTEN DO YOU ATTEND CHURCH OR RELIGIOUS SERVICES?: MORE THAN 4 TIMES PER YEAR
IN A TYPICAL WEEK, HOW MANY TIMES DO YOU TALK ON THE PHONE WITH FAMILY, FRIENDS, OR NEIGHBORS?: ONCE A WEEK
IN A TYPICAL WEEK, HOW MANY TIMES DO YOU TALK ON THE PHONE WITH FAMILY, FRIENDS, OR NEIGHBORS?: ONCE A WEEK
IN THE PAST 12 MONTHS, HAS THE ELECTRIC, GAS, OIL, OR WATER COMPANY THREATENED TO SHUT OFF SERVICE IN YOUR HOME?: NO
WITHIN THE PAST 12 MONTHS, YOU WORRIED THAT YOUR FOOD WOULD RUN OUT BEFORE YOU GOT THE MONEY TO BUY MORE: NEVER TRUE
HOW OFTEN DO YOU HAVE SIX OR MORE DRINKS ON ONE OCCASION: NEVER
HOW OFTEN DO YOU ATTENT MEETINGS OF THE CLUB OR ORGANIZATION YOU BELONG TO?: NEVER
HOW OFTEN DO YOU ATTENT MEETINGS OF THE CLUB OR ORGANIZATION YOU BELONG TO?: NEVER
DO YOU BELONG TO ANY CLUBS OR ORGANIZATIONS SUCH AS CHURCH GROUPS UNIONS, FRATERNAL OR ATHLETIC GROUPS, OR SCHOOL GROUPS?: NO
HOW OFTEN DO YOU ATTEND CHURCH OR RELIGIOUS SERVICES?: MORE THAN 4 TIMES PER YEAR
HOW OFTEN DO YOU GET TOGETHER WITH FRIENDS OR RELATIVES?: ONCE A WEEK

## 2024-06-24 ASSESSMENT — LIFESTYLE VARIABLES
HOW OFTEN DO YOU HAVE SIX OR MORE DRINKS ON ONE OCCASION: NEVER
HOW MANY STANDARD DRINKS CONTAINING ALCOHOL DO YOU HAVE ON A TYPICAL DAY: PATIENT DOES NOT DRINK

## 2024-06-27 ENCOUNTER — OFFICE VISIT (OUTPATIENT)
Dept: MEDICAL GROUP | Facility: PHYSICIAN GROUP | Age: 69
End: 2024-06-27
Payer: MEDICARE

## 2024-06-27 VITALS
BODY MASS INDEX: 31.54 KG/M2 | HEIGHT: 73 IN | RESPIRATION RATE: 14 BRPM | HEART RATE: 61 BPM | WEIGHT: 238 LBS | TEMPERATURE: 97.8 F | SYSTOLIC BLOOD PRESSURE: 130 MMHG | DIASTOLIC BLOOD PRESSURE: 80 MMHG | OXYGEN SATURATION: 97 %

## 2024-06-27 DIAGNOSIS — R80.9 MICROALBUMINURIA: ICD-10-CM

## 2024-06-27 DIAGNOSIS — E11.42 DIABETIC POLYNEUROPATHY ASSOCIATED WITH TYPE 2 DIABETES MELLITUS (HCC): ICD-10-CM

## 2024-06-27 DIAGNOSIS — Z80.0 FAMILY HISTORY OF COLON CANCER: ICD-10-CM

## 2024-06-27 DIAGNOSIS — E11.65 UNCONTROLLED TYPE 2 DIABETES MELLITUS WITH HYPERGLYCEMIA (HCC): ICD-10-CM

## 2024-06-27 DIAGNOSIS — E03.9 HYPOTHYROIDISM, UNSPECIFIED TYPE: ICD-10-CM

## 2024-06-27 DIAGNOSIS — L40.0 PLAQUE PSORIASIS: ICD-10-CM

## 2024-06-27 DIAGNOSIS — E78.5 HYPERLIPIDEMIA, UNSPECIFIED HYPERLIPIDEMIA TYPE: ICD-10-CM

## 2024-06-27 DIAGNOSIS — J43.9 PULMONARY EMPHYSEMA, UNSPECIFIED EMPHYSEMA TYPE (HCC): ICD-10-CM

## 2024-06-27 DIAGNOSIS — I10 PRIMARY HYPERTENSION: ICD-10-CM

## 2024-06-27 DIAGNOSIS — E55.9 VITAMIN D DEFICIENCY: ICD-10-CM

## 2024-06-27 PROBLEM — R10.12 LEFT UPPER QUADRANT ABDOMINAL PAIN: Status: RESOLVED | Noted: 2023-06-01 | Resolved: 2024-06-27

## 2024-06-27 ASSESSMENT — ACTIVITIES OF DAILY LIVING (ADL): BATHING_REQUIRES_ASSISTANCE: 0

## 2024-06-27 ASSESSMENT — PATIENT HEALTH QUESTIONNAIRE - PHQ9: CLINICAL INTERPRETATION OF PHQ2 SCORE: 0

## 2024-06-27 ASSESSMENT — ENCOUNTER SYMPTOMS: GENERAL WELL-BEING: POOR

## 2024-06-27 ASSESSMENT — FIBROSIS 4 INDEX: FIB4 SCORE: 2.09

## 2024-06-27 NOTE — ASSESSMENT & PLAN NOTE
Stable with normal tsh   Has chronic constipation and some weight gain  Is on levothyroxine 125 mcg

## 2024-06-27 NOTE — PROGRESS NOTES
Chief Complaint   Patient presents with    Medicare Annual Wellness       HPI:  Bryan Onofre is a 69 y.o. here for Medicare Annual Wellness Visit     Patient Active Problem List    Diagnosis Date Noted    Family history of colon cancer 06/27/2024    Plaque psoriasis 01/11/2023    Vitamin D deficiency 11/09/2022    Microalbuminuria 11/09/2022    Hypertension 11/09/2022    Chronic obstructive pulmonary disease (HCC) 11/08/2022    Diabetic neuropathy (HCC) 11/08/2022    Hyperlipidemia     Uncontrolled type 2 diabetes mellitus with hyperglycemia (HCC) 08/18/2022    Hypothyroidism 08/06/2014    Calculus of gallbladder 12/13/2012       Current Outpatient Medications   Medication Sig Dispense Refill    Semaglutide, 1 MG/DOSE, (OZEMPIC, 1 MG/DOSE,) 4 MG/3ML Solution Pen-injector Inject 2 mg under the skin every 7 days. For uncontrolled DM2, A1c >9 18 mL 3    glimepiride (AMARYL) 2 MG Tab Take 1 Tablet by mouth every morning. For uncontrolled diabetes. Stop if blood sugars drop below 90. 90 Tablet 3    insulin glargine (LANTUS SOLOSTAR) 100 UNIT/ML Solution Pen-injector injection Inject 10 Units under the skin every evening. 9 mL 3    tamsulosin (FLOMAX) 0.4 MG capsule Take 1 Capsule by mouth 1/2 hour after dinner. For prostate 90 Capsule 3    rosuvastatin (CRESTOR) 10 MG Tab Take 1 Tablet by mouth every evening. 90 Tablet 3    omeprazole (PRILOSEC) 20 MG delayed-release capsule Take 2 Capsules by mouth every day. For Vallecillo's esophagus 180 Capsule 3    metFORMIN (GLUCOPHAGE) 500 MG Tab Take 1 Tablet by mouth 3 times a day with meals. 270 Tablet 3    losartan (COZAAR) 25 MG Tab Take 1 Tablet by mouth every day. To protect kidneys 90 Tablet 3    levothyroxine (SYNTHROID) 125 MCG Tab Take 1 Tablet by mouth every morning on an empty stomach. 90 Tablet 3    hydroCHLOROthiazide 25 MG Tab Take 1 Tablet by mouth every day. For high blood pressure 90 Tablet 3    fenofibrate (TRICOR) 145 MG Tab Take 1 Tablet by mouth every  day. For high triglycerides 90 Tablet 3    DULoxetine (CYMBALTA) 60 MG Cap DR Particles delayed-release capsule Take 1 Capsule by mouth every day. For neuropathy pain 90 Capsule 3    budesonide-formoterol (SYMBICORT) 80-4.5 MCG/ACT Aerosol Inhale 2 Puffs 2 times a day. 30.6 g 3    atenolol (TENORMIN) 25 MG Tab Take 1 Tablet by mouth every day. For high blood pressure 90 Tablet 3    aspirin (ASA) 81 MG Chew Tab chewable tablet Chew 1 Tablet every day. 100 Tablet 3    clobetasol (TEMOVATE) 0.05 % Cream Apply in a thin film to affected skin once a day for plaque psoriasis. 60 g 11    tacrolimus (PROTOPIC) 0.1 % Ointment Apply a thin film to affected skin twice a day for 6-8 week for plaque psoriasis. 100 g 3    albuterol (PROVENTIL) 90 MCG/ACT Aero Soln Inhale 2 Puffs every 6 hours as needed for Shortness of Breath.       No current facility-administered medications for this visit.          Current supplements as per medication list.     Allergies: Nkda [no known drug allergy]    Current social contact/activities: yes     He  reports that he has been smoking cigarettes. He has a 60 pack-year smoking history. He has been exposed to tobacco smoke. He has never used smokeless tobacco. He reports that he does not drink alcohol and does not use drugs.  Ready to quit: No  Counseling given: Yes  Tobacco comments: 2 ppd 37 yrs currently still smoking      ROS:    Gait: Uses no assistive device  Ostomy: No  Other tubes: No  Amputations: No  Chronic oxygen use: No  Last eye exam: 2024  Wears hearing aids: No   : Reports urinary leakage during the last 6 months that has not interfered at all with their daily activities or sleep.    Screening:    Depression Screening  Little interest or pleasure in doing things?  0 - not at all  Feeling down, depressed , or hopeless? 0 - not at all  Patient Health Questionnaire Score: 0     If depressive symptoms identified deferred to follow up visit unless specifically addressed in assessment  and plan.    Interpretation of PHQ-9 Total Score   Score Severity   1-4 No Depression   5-9 Mild Depression   10-14 Moderate Depression   15-19 Moderately Severe Depression   20-27 Severe Depression    Screening for Cognitive Impairment  Do you or any of your friends or family members have any concern about your memory? No  Three Minute Recall (Leader, Season, Table) 3/3    Juan clock face with all 12 numbers and set the hands to show 10 minutes after 11.  Yes    Cognitive concerns identified deferred for follow up unless specifically addressed in assessment and plan.    Fall Risk Assessment  Has the patient had two or more falls in the last year or any fall with injury in the last year?  No    Safety Assessment  Do you always wear your seatbelt?  Yes  Any changes to home needed to function safely? No  Difficulty hearing.  Yes  Patient counseled about all safety risks that were identified.    Functional Assessment ADLs  Are there any barriers preventing you from cooking for yourself or meeting nutritional needs?  No.    Are there any barriers preventing you from driving safely or obtaining transportation?  No.    Are there any barriers preventing you from using a telephone or calling for help?  No    Are there any barriers preventing you from shopping?  No.    Are there any barriers preventing you from taking care of your own finances?  No    Are there any barriers preventing you from managing your medications?  No    Are there any barriers preventing you from showering, bathing or dressing yourself? No    Are there any barriers preventing you from doing housework or laundry? No  Are there any barriers preventing you from using the toilet?No  Are you currently engaging in any exercise or physical activity?  Yes.      Self-Assessment of Health  What is your perception of your health? Poor    Do you sleep more than six hours a night? No    In the past 7 days, how much did pain keep you from doing your normal work?  None    Do you spend quality time with family or friends (virtually or in person)? Yes    Do you usually eat a heart healthy diet that constists of a variety of fruits, vegetables, whole grains and fiber? No    Do you eat foods high in fat and/or Fast Food more than three times per week? No    How concerned are you that your medical conditions are not being well managed? Not at all    Are you worried that in the next 2 months, you may not have stable housing that you own, rent, or stay in as part of a household? No      Advance Care Planning  Do you have an Advance Directive, Living Will, Durable Power of , or POLST? Yes  Advance Directive       is on file      Health Maintenance Summary            Ordered - Annual Pulmonary Function Test / Spirometry (Yearly) Ordered on 6/27/2024      No completion history exists for this topic.              Overdue - IMM DTaP/Tdap/Td Vaccine (1 - Tdap) Never done      No completion history exists for this topic.              Overdue - Zoster (Shingles) Vaccines (1 of 2) Never done      No completion history exists for this topic.              Ordered - Fasting Lipid Profile (Yearly) Ordered on 4/16/2024 04/07/2023  Lipid Profile    01/05/2023  Lipid Profile    08/18/2022  Lipid Profile    06/12/2015  LIPID PROFILE    06/09/2014  LIPID PROFILE    Only the first 5 history entries have been loaded, but more history exists.              Ordered - A1c Screening (Every 6 Months) Ordered on 4/16/2024 04/08/2024  Hemoglobin A1c    11/03/2023  HEMOGLOBIN A1C    07/19/2023  HEMOGLOBIN A1C    04/07/2023  HEMOGLOBIN A1C    01/05/2023  HEMOGLOBIN A1C    Only the first 5 history entries have been loaded, but more history exists.              Diabetes: Urine Protein Screening (Yearly) Next due on 11/3/2024      11/03/2023  MICROALBUMIN CREAT RATIO URINE    08/18/2022  MICROALBUMIN CREAT RATIO URINE    02/23/2015  MICROALBUMIN CREAT RATIO URINE    05/27/2014  MICROALBUMIN CREAT  RATIO URINE    06/03/2013  MICROALBUMIN CREAT RATIO URINE    Only the first 5 history entries have been loaded, but more history exists.              Diabetes: Monofilament / LE Exam (Yearly) Tentatively due on 11/10/2024      11/10/2023  Diabetic Monofilament Lower Extremity Exam    11/08/2022  Diabetic Monofilament LE Exam              Ordered - SERUM CREATININE (Yearly) Ordered on 4/16/2024 04/08/2024  Basic Metabolic Panel    11/03/2023  Comp Metabolic Panel    04/07/2023  Comp Metabolic Panel    01/05/2023  Comp Metabolic Panel    08/18/2022  Comp Metabolic Panel    Only the first 5 history entries have been loaded, but more history exists.              Diabetes: Retinopathy Screening (Yearly) Next due on 4/16/2025 04/16/2024  POCT Retinal Eye Exam    04/12/2023  POCT Retinal Eye Exam              Annual Wellness Visit (Yearly) Next due on 6/27/2025 06/27/2024  Initial Annual Wellness Visit - Includes PPPS ()              Colorectal Cancer Screening (Colonoscopy - Every 3 Years) Tentatively due on 11/11/2025 11/11/2022  COLONOSCOPY RESULTS    11/11/2022  AMB EXTERNAL COLONOSCOPY RESULTS    11/11/2022  COLONOSCOPY RESULTS    06/03/2013  Occult Blood Feces component of OCCULT BLOOD STOOL              Hepatitis C Screening  Completed      06/17/2014  Hepatitis C Antibody component of HEP C VIRUS ANTIBODY    09/22/2012  Hepatitis C Antibody component of HEP C VIRUS ANTIBODY              Abdominal Aortic Aneurysm (AAA) Screening  Tentatively Complete      06/02/2023  US-ABDOMEN COMPLETE SURVEY    01/08/2010  US-ABDOMEN COMPLETE SURVEY    08/11/2009  CT-RENAL COLIC EVALUATION(A/P W/O)              Influenza Vaccine (Series Information) Completed      09/23/2023  Outside Immunization: Fluzone High-Dose Quad    10/11/2022  Imm Admin: Influenza Vaccine Adult HD    10/12/2015  Imm Admin: Influenza Vaccine Quad Inj (Pf)    10/07/2014  Imm Admin: Influenza Vaccine Quad Inj (Pf)    10/16/2013  Imm  Admin: Influenza Vaccine Quad Inj (Preserved)    Only the first 5 history entries have been loaded, but more history exists.              COVID-19 Vaccine (Series Information) Completed      09/23/2023  Imm Admin: Covid-19 Mrna (Spikevax) Moderna 12+ Years    10/19/2022  Imm Admin: PFIZER BIVALENT SARS-COV-2 VACCINE (12+)              Lung Cancer Screening Shared Decision Making  Completed      11/10/2023  [employer]: FINDINGS - TESTS - IMAGING - CT SCAN - LOW DOSE CT LUNG SCREENING - LOW-DOSE CT SHARED DECISION MAKING OUTCOME - PATIENT ELECTS SCREENING              Hepatitis A Vaccine (Hep A) (Series Information) Aged Out      No completion history exists for this topic.              Hepatitis B Vaccine (Hep B) (Series Information) Aged Out      No completion history exists for this topic.              HPV Vaccines (Series Information) Aged Out      No completion history exists for this topic.              Polio Vaccine (Inactivated Polio) (Series Information) Aged Out      No completion history exists for this topic.              Meningococcal Immunization (Series Information) Aged Out      No completion history exists for this topic.              Discontinued - Pneumococcal Vaccine: 65+ Years  Discontinued      No completion history exists for this topic.                    Patient Care Team:  Mary Carvalho M.D. as PCP - General (Family Medicine)        Social History     Tobacco Use    Smoking status: Every Day     Current packs/day: 1.50     Average packs/day: 1.5 packs/day for 40.0 years (60.0 ttl pk-yrs)     Types: Cigarettes     Passive exposure: Current    Smokeless tobacco: Never    Tobacco comments:     2 ppd 37 yrs currently still smoking   Vaping Use    Vaping status: Never Used   Substance Use Topics    Alcohol use: No    Drug use: No     Family History   Problem Relation Age of Onset    Cancer Mother         colon     Arthritis Mother     Cancer Father         colon     Cancer Brother         colon   "    He  has a past medical history of Anesthesia, Arthritis, Vallecillo esophagus, Breath shortness, COPD, Diabetes (12/30/09), Diabetic neuropathy (HCC), Elevated LFT's, GERD (gastroesophageal reflux disease), Heart burn, Hiatus hernia syndrome, Hyperlipidemia, Hypertension, Indigestion, Pain, Sleep apnea, and Unspecified disorder of thyroid.   Past Surgical History:   Procedure Laterality Date    ARELI BY LAPAROSCOPY  12/13/2012    Performed by Stephanie Dennis M.D. at SUR[facility]ERY [facility]    KNEE ARTHROPLASTY TOTAL  1/26/2010    Performed by ANGELIC FLORES at SUR[facility]ERY [facility]    KNEE ARTHROSCOPY  2005    right    OTHER      esophogeal ablation x3       Exam:   /80   Pulse 61   Temp 36.6 °C (97.8 °F) (Temporal)   Resp 14   Ht 1.854 m (6' 1\")   Wt 108 kg (238 lb)   SpO2 97%  Body mass index is 31.4 kg/m².    Hearing poor.    Dentition fair  Alert, oriented in no acute distress.  Eye contact is good, speech goal directed, affect calm    Assessment and Plan. The following treatment and monitoring plan is recommended:      Uncontrolled type 2 diabetes mellitus with hyperglycemia (HCC)  Improving but still uncontrolled  Was started on lantus and glimepiride last visit   Lowest blood sugars are 114, highest 170s    Vitamin D deficiency  Takes vit d supplement daily    Plaque psoriasis  Flares up once in a while, worse in winter for him.   Declines ref to derm.     Microalbuminuria  Due to DM2, continue on losartan.     Hypothyroidism  Stable with normal tsh   Has chronic constipation and some weight gain  Is on levothyroxine 125 mcg    Hypertension  Chronic condition, controlled on losartan 25 mg, atenolol 25 mg daily. With no chest pain.     Hyperlipidemia  Chronic condition, is on rosuvastatin 10 mg  With no myalgia    Diabetic neuropathy (HCC)  On cymbalta which does seem to help with chronic diabetes related neuropathy. Feet hurt worse than his hands. Legs always have been numb.     Chronic " obstructive pulmonary disease (HCC)  Stable on symbicort.   Hx of asbestos and agent orange exposure.     Family history of colon cancer  Brother and mother diagnosed with colon ca.   Pt's last colonoscopy had 5 polyps removed, next colonoscopy is due in 1 year.            Services suggested: No services needed at this time  Health Care Screening: Age-appropriate preventive services recommended by USPTF and ACIP covered by Medicare were discussed today. Services ordered if indicated and agreed upon by the patient.  Referrals offered: Community-based lifestyle interventions to reduce health risks and promote self-management and wellness, fall prevention, nutrition, physical activity, tobacco-use cessation, weight loss, and mental health services as per orders if indicated.    Discussion today about general wellness and lifestyle habits:    Prevent falls and reduce trip hazards; Cautioned about securing or removing rugs.  Have a working fire alarm and carbon monoxide detector;   Engage in regular physical activity and social activities     Follow-up: Return in about 1 month (around 7/27/2024) for Lab Review, Diabetes.

## 2024-06-27 NOTE — ASSESSMENT & PLAN NOTE
On cymbalta which does seem to help with chronic diabetes related neuropathy. Feet hurt worse than his hands. Legs always have been numb.

## 2024-06-27 NOTE — ASSESSMENT & PLAN NOTE
Improving but still uncontrolled  Was started on lantus and glimepiride last visit   Lowest blood sugars are 114, highest 170s

## 2024-06-27 NOTE — ASSESSMENT & PLAN NOTE
Brother and mother diagnosed with colon ca.   Pt's last colonoscopy had 5 polyps removed, next colonoscopy is due in 1 year.

## 2024-07-18 ENCOUNTER — HOSPITAL ENCOUNTER (OUTPATIENT)
Dept: LAB | Facility: MEDICAL CENTER | Age: 69
End: 2024-07-18
Attending: FAMILY MEDICINE
Payer: MEDICARE

## 2024-07-18 DIAGNOSIS — E78.5 HYPERLIPIDEMIA, UNSPECIFIED HYPERLIPIDEMIA TYPE: ICD-10-CM

## 2024-07-18 DIAGNOSIS — E11.65 UNCONTROLLED TYPE 2 DIABETES MELLITUS WITH HYPERGLYCEMIA (HCC): ICD-10-CM

## 2024-07-18 LAB
ALBUMIN SERPL BCP-MCNC: 4.1 G/DL (ref 3.2–4.9)
ALBUMIN/GLOB SERPL: 1.4 G/DL
ALP SERPL-CCNC: 47 U/L (ref 30–99)
ALT SERPL-CCNC: 49 U/L (ref 2–50)
ANION GAP SERPL CALC-SCNC: 16 MMOL/L (ref 7–16)
AST SERPL-CCNC: 55 U/L (ref 12–45)
BILIRUB SERPL-MCNC: 0.5 MG/DL (ref 0.1–1.5)
BUN SERPL-MCNC: 10 MG/DL (ref 8–22)
CALCIUM ALBUM COR SERPL-MCNC: 9.5 MG/DL (ref 8.5–10.5)
CALCIUM SERPL-MCNC: 9.6 MG/DL (ref 8.5–10.5)
CHLORIDE SERPL-SCNC: 97 MMOL/L (ref 96–112)
CHOLEST SERPL-MCNC: 109 MG/DL (ref 100–199)
CO2 SERPL-SCNC: 22 MMOL/L (ref 20–33)
CREAT SERPL-MCNC: 0.97 MG/DL (ref 0.5–1.4)
EST. AVERAGE GLUCOSE BLD GHB EST-MCNC: 160 MG/DL
FASTING STATUS PATIENT QL REPORTED: NORMAL
GFR SERPLBLD CREATININE-BSD FMLA CKD-EPI: 83 ML/MIN/1.73 M 2
GLOBULIN SER CALC-MCNC: 3 G/DL (ref 1.9–3.5)
GLUCOSE SERPL-MCNC: 148 MG/DL (ref 65–99)
HBA1C MFR BLD: 7.2 % (ref 4–5.6)
HDLC SERPL-MCNC: 27 MG/DL
LDLC SERPL CALC-MCNC: 34 MG/DL
POTASSIUM SERPL-SCNC: 4.5 MMOL/L (ref 3.6–5.5)
PROT SERPL-MCNC: 7.1 G/DL (ref 6–8.2)
SODIUM SERPL-SCNC: 135 MMOL/L (ref 135–145)
TRIGL SERPL-MCNC: 242 MG/DL (ref 0–149)

## 2024-07-18 PROCEDURE — 80053 COMPREHEN METABOLIC PANEL: CPT

## 2024-07-18 PROCEDURE — 83036 HEMOGLOBIN GLYCOSYLATED A1C: CPT | Mod: GA

## 2024-07-18 PROCEDURE — 80061 LIPID PANEL: CPT

## 2024-07-18 PROCEDURE — 36415 COLL VENOUS BLD VENIPUNCTURE: CPT

## 2024-07-30 ENCOUNTER — OFFICE VISIT (OUTPATIENT)
Dept: MEDICAL GROUP | Facility: PHYSICIAN GROUP | Age: 69
End: 2024-07-30
Payer: MEDICARE

## 2024-07-30 ENCOUNTER — APPOINTMENT (OUTPATIENT)
Dept: MEDICAL GROUP | Facility: PHYSICIAN GROUP | Age: 69
End: 2024-07-30
Payer: MEDICARE

## 2024-07-30 VITALS
HEIGHT: 73 IN | BODY MASS INDEX: 31.14 KG/M2 | DIASTOLIC BLOOD PRESSURE: 74 MMHG | WEIGHT: 235 LBS | RESPIRATION RATE: 18 BRPM | SYSTOLIC BLOOD PRESSURE: 120 MMHG | TEMPERATURE: 97.9 F | OXYGEN SATURATION: 97 % | HEART RATE: 74 BPM

## 2024-07-30 DIAGNOSIS — R79.89 ELEVATED LFTS: ICD-10-CM

## 2024-07-30 DIAGNOSIS — R79.89 ABNORMAL CBC: ICD-10-CM

## 2024-07-30 DIAGNOSIS — E11.65 UNCONTROLLED TYPE 2 DIABETES MELLITUS WITH HYPERGLYCEMIA (HCC): ICD-10-CM

## 2024-07-30 ASSESSMENT — FIBROSIS 4 INDEX: FIB4 SCORE: 2.3

## 2024-10-26 ENCOUNTER — HOSPITAL ENCOUNTER (OUTPATIENT)
Dept: LAB | Facility: MEDICAL CENTER | Age: 69
End: 2024-10-26
Attending: FAMILY MEDICINE
Payer: MEDICARE

## 2024-10-26 DIAGNOSIS — E11.65 UNCONTROLLED TYPE 2 DIABETES MELLITUS WITH HYPERGLYCEMIA (HCC): ICD-10-CM

## 2024-10-26 DIAGNOSIS — R79.89 ABNORMAL CBC: ICD-10-CM

## 2024-10-26 DIAGNOSIS — R79.89 ELEVATED LFTS: ICD-10-CM

## 2024-10-26 LAB
ALBUMIN SERPL BCP-MCNC: 4.7 G/DL (ref 3.2–4.9)
ALP SERPL-CCNC: 47 U/L (ref 30–99)
ALT SERPL-CCNC: 44 U/L (ref 2–50)
AST SERPL-CCNC: 48 U/L (ref 12–45)
BASOPHILS # BLD AUTO: 2.2 % (ref 0–1.8)
BASOPHILS # BLD: 0.12 K/UL (ref 0–0.12)
BILIRUB CONJ SERPL-MCNC: <0.2 MG/DL (ref 0.1–0.5)
BILIRUB INDIRECT SERPL-MCNC: ABNORMAL MG/DL (ref 0–1)
BILIRUB SERPL-MCNC: 0.7 MG/DL (ref 0.1–1.5)
CHOLEST SERPL-MCNC: 107 MG/DL (ref 100–199)
CREAT UR-MCNC: 124 MG/DL
EOSINOPHIL # BLD AUTO: 0.13 K/UL (ref 0–0.51)
EOSINOPHIL NFR BLD: 2.4 % (ref 0–6.9)
ERYTHROCYTE [DISTWIDTH] IN BLOOD BY AUTOMATED COUNT: 42.5 FL (ref 35.9–50)
EST. AVERAGE GLUCOSE BLD GHB EST-MCNC: 180 MG/DL
FASTING STATUS PATIENT QL REPORTED: NORMAL
HBA1C MFR BLD: 7.9 % (ref 4–5.6)
HCT VFR BLD AUTO: 44.8 % (ref 42–52)
HDLC SERPL-MCNC: 30 MG/DL
HGB BLD-MCNC: 16.1 G/DL (ref 14–18)
IMM GRANULOCYTES # BLD AUTO: 0.01 K/UL (ref 0–0.11)
IMM GRANULOCYTES NFR BLD AUTO: 0.2 % (ref 0–0.9)
LDLC SERPL CALC-MCNC: 32 MG/DL
LYMPHOCYTES # BLD AUTO: 2.01 K/UL (ref 1–4.8)
LYMPHOCYTES NFR BLD: 36.7 % (ref 22–41)
MCH RBC QN AUTO: 34.1 PG (ref 27–33)
MCHC RBC AUTO-ENTMCNC: 35.9 G/DL (ref 32.3–36.5)
MCV RBC AUTO: 94.9 FL (ref 81.4–97.8)
MICROALBUMIN UR-MCNC: <1.2 MG/DL
MICROALBUMIN/CREAT UR: NORMAL MG/G (ref 0–30)
MONOCYTES # BLD AUTO: 0.41 K/UL (ref 0–0.85)
MONOCYTES NFR BLD AUTO: 7.5 % (ref 0–13.4)
NEUTROPHILS # BLD AUTO: 2.8 K/UL (ref 1.82–7.42)
NEUTROPHILS NFR BLD: 51 % (ref 44–72)
NRBC # BLD AUTO: 0 K/UL
NRBC BLD-RTO: 0 /100 WBC (ref 0–0.2)
PLATELET # BLD AUTO: 214 K/UL (ref 164–446)
PMV BLD AUTO: 11 FL (ref 9–12.9)
PROT SERPL-MCNC: 7.8 G/DL (ref 6–8.2)
RBC # BLD AUTO: 4.72 M/UL (ref 4.7–6.1)
TRIGL SERPL-MCNC: 226 MG/DL (ref 0–149)
WBC # BLD AUTO: 5.5 K/UL (ref 4.8–10.8)

## 2024-10-26 PROCEDURE — 36415 COLL VENOUS BLD VENIPUNCTURE: CPT

## 2024-10-26 PROCEDURE — 85025 COMPLETE CBC W/AUTO DIFF WBC: CPT

## 2024-10-26 PROCEDURE — 83036 HEMOGLOBIN GLYCOSYLATED A1C: CPT | Mod: GZ

## 2024-10-26 PROCEDURE — 82043 UR ALBUMIN QUANTITATIVE: CPT

## 2024-10-26 PROCEDURE — 80076 HEPATIC FUNCTION PANEL: CPT

## 2024-10-26 PROCEDURE — 82570 ASSAY OF URINE CREATININE: CPT

## 2024-10-26 PROCEDURE — 80061 LIPID PANEL: CPT

## 2024-10-31 ENCOUNTER — OFFICE VISIT (OUTPATIENT)
Dept: MEDICAL GROUP | Facility: PHYSICIAN GROUP | Age: 69
End: 2024-10-31
Payer: MEDICARE

## 2024-10-31 VITALS
HEIGHT: 73 IN | TEMPERATURE: 97 F | SYSTOLIC BLOOD PRESSURE: 118 MMHG | BODY MASS INDEX: 31.14 KG/M2 | OXYGEN SATURATION: 94 % | DIASTOLIC BLOOD PRESSURE: 70 MMHG | WEIGHT: 235 LBS | RESPIRATION RATE: 18 BRPM | HEART RATE: 84 BPM

## 2024-10-31 DIAGNOSIS — Z80.0 FAMILY HISTORY OF COLON CANCER: ICD-10-CM

## 2024-10-31 DIAGNOSIS — E11.65 UNCONTROLLED TYPE 2 DIABETES MELLITUS WITH HYPERGLYCEMIA (HCC): ICD-10-CM

## 2024-10-31 DIAGNOSIS — F17.210 NICOTINE DEPENDENCE, CIGARETTES, UNCOMPLICATED: ICD-10-CM

## 2024-10-31 PROCEDURE — 99214 OFFICE O/P EST MOD 30 MIN: CPT | Performed by: FAMILY MEDICINE

## 2024-10-31 PROCEDURE — 3078F DIAST BP <80 MM HG: CPT | Performed by: FAMILY MEDICINE

## 2024-10-31 PROCEDURE — 3074F SYST BP LT 130 MM HG: CPT | Performed by: FAMILY MEDICINE

## 2024-10-31 ASSESSMENT — FIBROSIS 4 INDEX: FIB4 SCORE: 2.4

## 2024-10-31 NOTE — ASSESSMENT & PLAN NOTE
His mother, father and two brothers passed away from colon cancer.   Pt had colonoscopy 2022 with polyps removed with next colonoscopy due 2025

## 2024-11-05 ENCOUNTER — TELEPHONE (OUTPATIENT)
Dept: HEALTH INFORMATION MANAGEMENT | Facility: OTHER | Age: 69
End: 2024-11-05
Payer: MEDICARE

## 2024-11-08 NOTE — TELEPHONE ENCOUNTER
1.  Age 50-77 yrs of age? 69 yrs of age     2. Total Years Smoking cigarettes: =55       3. 20 pack year hx of smoking, or greater (average packs per day)?  55 yrs  X2 /day=  110 pk yr hx    4. Current smoker or if quit, has pt quit within last 15 yrs? current    5. Completed Lung Cancer screen CT with [facility] previously? no     6.  Anything noted on previous CT involving lungs? (Nodules, Mass, Etc.)  no     7. Any signs or symptoms of lung cancer? ( New / change to Cough, Wheezing, S.O.B., coughing up blood, unexplained weight loss within last year) no     8. Previous history of lung cancer? no

## 2024-11-11 NOTE — PROGRESS NOTES
Subjective     Bryan Onofre is a 69 y.o. male who presents with Lung Cancer Screening Program Prescreen and Nicotine Dependence            HPI  Patient seen today for initial lung cancer screening visit. Patient referred by Dr. Mary Carvalho.     The patient meets eligibility criteria including age, smoking history (20+ pack years), if former smoker, quit in the last 15 years, and absence of signs or symptoms of lung cancer.    - Age - 69  - Smoking history - Patient has smoked for 55 years at an average of 1.5 ppd = 82.5 pack year smoking history.  - Current smoking status - current smoker  - No symptoms of lung cancer and no previous history of lung cancer     He has 3 years of agent orange exposure and 21 years of asbestos exposure and 20+ years pf dust, silica and pollution exposure.  He had skin cancer of the ear.    Allergies   Allergen Reactions    Nkda [No Known Drug Allergy]        Current Outpatient Medications on File Prior to Visit   Medication Sig Dispense Refill    metFORMIN (GLUCOPHAGE) 500 MG Tab Take 2 Tablets by mouth 2 times a day with meals. 360 Tablet 3    Semaglutide, 1 MG/DOSE, (OZEMPIC, 1 MG/DOSE,) 4 MG/3ML Solution Pen-injector Inject 2 mg under the skin every 7 days. For uncontrolled DM2, A1c >9 18 mL 3    glimepiride (AMARYL) 2 MG Tab Take 1 Tablet by mouth every morning. For uncontrolled diabetes. Stop if blood sugars drop below 90. 90 Tablet 3    insulin glargine (LANTUS SOLOSTAR) 100 UNIT/ML Solution Pen-injector injection Inject 10 Units under the skin every evening. 9 mL 3    tamsulosin (FLOMAX) 0.4 MG capsule Take 1 Capsule by mouth 1/2 hour after dinner. For prostate 90 Capsule 3    rosuvastatin (CRESTOR) 10 MG Tab Take 1 Tablet by mouth every evening. 90 Tablet 3    omeprazole (PRILOSEC) 20 MG delayed-release capsule Take 2 Capsules by mouth every day. For Vallecillo's esophagus 180 Capsule 3    losartan (COZAAR) 25 MG Tab Take 1 Tablet by mouth every day. To protect kidneys  "90 Tablet 3    levothyroxine (SYNTHROID) 125 MCG Tab Take 1 Tablet by mouth every morning on an empty stomach. 90 Tablet 3    hydroCHLOROthiazide 25 MG Tab Take 1 Tablet by mouth every day. For high blood pressure 90 Tablet 3    fenofibrate (TRICOR) 145 MG Tab Take 1 Tablet by mouth every day. For high triglycerides 90 Tablet 3    DULoxetine (CYMBALTA) 60 MG Cap DR Particles delayed-release capsule Take 1 Capsule by mouth every day. For neuropathy pain 90 Capsule 3    budesonide-formoterol (SYMBICORT) 80-4.5 MCG/ACT Aerosol Inhale 2 Puffs 2 times a day. 30.6 g 3    atenolol (TENORMIN) 25 MG Tab Take 1 Tablet by mouth every day. For high blood pressure 90 Tablet 3    aspirin (ASA) 81 MG Chew Tab chewable tablet Chew 1 Tablet every day. 100 Tablet 3    clobetasol (TEMOVATE) 0.05 % Cream Apply in a thin film to affected skin once a day for plaque psoriasis. 60 g 11    tacrolimus (PROTOPIC) 0.1 % Ointment Apply a thin film to affected skin twice a day for 6-8 week for plaque psoriasis. 100 g 3    albuterol (PROVENTIL) 90 MCG/ACT Aero Soln Inhale 2 Puffs every 6 hours as needed for Shortness of Breath.       No current facility-administered medications on file prior to visit.       Review of Systems   Constitutional:  Negative for chills, fever and weight loss.   Respiratory:  Positive for cough, sputum production, shortness of breath and wheezing. Negative for hemoptysis.         Chronic productive cough in the AM sticky sputum (no definitive color) for years without recent worsening for the last couple years.  He has chronic SOB and wheezing which has been improving over the last couple years with his inhaler use.   Cardiovascular:  Negative for chest pain and palpitations.              Objective     /80 (BP Location: Right arm, Patient Position: Sitting, BP Cuff Size: Adult)   Pulse 84   Resp 16   Ht 1.854 m (6' 1\")   Wt 109 kg (241 lb)   SpO2 94%   BMI 31.80 kg/m²      Physical Exam  Constitutional:       " Appearance: Normal appearance.   Cardiovascular:      Rate and Rhythm: Normal rate and regular rhythm.   Pulmonary:      Effort: Pulmonary effort is normal. No respiratory distress.      Breath sounds: Wheezing present. No rhonchi or rales.      Comments: Rare expiratory wheezes  Musculoskeletal:         General: No swelling.   Neurological:      Mental Status: He is alert.                 Assessment & Plan        Assessment & Plan  Cigarette smoker    Orders:    CT-LUNG CANCER-SCREENING; Future       We conducted a shared decision-making process using a decision aid. We reviewed benefits and harms of screening, including false positives and potential need for additional diagnostic testing, the possibility of over diagnosis, and total radiation exposure.    We discussed the importance of adhering to annual LDCT screening. We also discussed the impact of comorbities on the patient's the ability or willingness to undergo diagnostic procedure(s) and treatment.    Counseling on the importance of maintaining cigarette smoking abstinence if former smoker; or the importance of smoking cessation if current smoker and, if appropriate, furnishing of information about tobacco cessation interventions.    Based on our discussion, we have decided to begin annual lung cancer screening starting now.      No follow-up needed

## 2024-11-12 ENCOUNTER — OFFICE VISIT (OUTPATIENT)
Dept: SLEEP MEDICINE | Facility: MEDICAL CENTER | Age: 69
End: 2024-11-12
Attending: FAMILY MEDICINE
Payer: MEDICARE

## 2024-11-12 VITALS
OXYGEN SATURATION: 94 % | BODY MASS INDEX: 31.94 KG/M2 | DIASTOLIC BLOOD PRESSURE: 80 MMHG | WEIGHT: 241 LBS | HEIGHT: 73 IN | HEART RATE: 84 BPM | RESPIRATION RATE: 16 BRPM | SYSTOLIC BLOOD PRESSURE: 138 MMHG

## 2024-11-12 DIAGNOSIS — F17.210 CIGARETTE SMOKER: ICD-10-CM

## 2024-11-12 PROCEDURE — G0296 VISIT TO DETERM LDCT ELIG: HCPCS | Performed by: FAMILY MEDICINE

## 2024-11-12 PROCEDURE — 3079F DIAST BP 80-89 MM HG: CPT | Performed by: FAMILY MEDICINE

## 2024-11-12 PROCEDURE — 3075F SYST BP GE 130 - 139MM HG: CPT | Performed by: FAMILY MEDICINE

## 2024-11-12 ASSESSMENT — ENCOUNTER SYMPTOMS
SPUTUM PRODUCTION: 1
FEVER: 0
CHILLS: 0
WEIGHT LOSS: 0
COUGH: 1
WHEEZING: 1
SHORTNESS OF BREATH: 1
HEMOPTYSIS: 0
PALPITATIONS: 0

## 2024-11-12 ASSESSMENT — FIBROSIS 4 INDEX: FIB4 SCORE: 2.4

## 2024-11-12 NOTE — Clinical Note
Thank you for referring Bryan to the Lung Cancer Screening program.  I enrolled him today. I will update you re: abnormal findings. -Dr. Elvira Gonsalez

## 2024-11-13 ENCOUNTER — TELEPHONE (OUTPATIENT)
Dept: HEALTH INFORMATION MANAGEMENT | Facility: OTHER | Age: 69
End: 2024-11-13
Payer: MEDICARE

## 2024-11-13 NOTE — ASSESSMENT & PLAN NOTE
Improving but still uncontrolled A1c 7.9  Was started on lantus and glimepiride last visit, is on ozempic 2 mg/wk, metformin 1000 bid.   Lowest blood sugars are 114, highest 170s  Continue losartan, rosuvastatin.

## 2024-11-13 NOTE — PROGRESS NOTES
Subjective:   Bryan Onofre is a 69 y.o. male here today for evaluation and management of:     Family history of colon cancer  His mother, father and two brothers passed away from colon cancer.   Pt had colonoscopy 2022 with polyps removed with next colonoscopy due 2025    Uncontrolled type 2 diabetes mellitus with hyperglycemia (HCC)  Improving but still uncontrolled A1c 7.9  Was started on lantus and glimepiride last visit, is on ozempic 2 mg/wk, metformin 1000 bid.   Lowest blood sugars are 114, highest 170s  Continue losartan, rosuvastatin.          Current medicines (including changes today)  Current Outpatient Medications   Medication Sig Dispense Refill    metFORMIN (GLUCOPHAGE) 500 MG Tab Take 2 Tablets by mouth 2 times a day with meals. 360 Tablet 3    Semaglutide, 1 MG/DOSE, (OZEMPIC, 1 MG/DOSE,) 4 MG/3ML Solution Pen-injector Inject 2 mg under the skin every 7 days. For uncontrolled DM2, A1c >9 18 mL 3    glimepiride (AMARYL) 2 MG Tab Take 1 Tablet by mouth every morning. For uncontrolled diabetes. Stop if blood sugars drop below 90. 90 Tablet 3    insulin glargine (LANTUS SOLOSTAR) 100 UNIT/ML Solution Pen-injector injection Inject 10 Units under the skin every evening. 9 mL 3    tamsulosin (FLOMAX) 0.4 MG capsule Take 1 Capsule by mouth 1/2 hour after dinner. For prostate 90 Capsule 3    rosuvastatin (CRESTOR) 10 MG Tab Take 1 Tablet by mouth every evening. 90 Tablet 3    omeprazole (PRILOSEC) 20 MG delayed-release capsule Take 2 Capsules by mouth every day. For Vallecillo's esophagus 180 Capsule 3    losartan (COZAAR) 25 MG Tab Take 1 Tablet by mouth every day. To protect kidneys 90 Tablet 3    levothyroxine (SYNTHROID) 125 MCG Tab Take 1 Tablet by mouth every morning on an empty stomach. 90 Tablet 3    hydroCHLOROthiazide 25 MG Tab Take 1 Tablet by mouth every day. For high blood pressure 90 Tablet 3    fenofibrate (TRICOR) 145 MG Tab Take 1 Tablet by mouth every day. For high triglycerides 90  "Tablet 3    DULoxetine (CYMBALTA) 60 MG Cap DR Particles delayed-release capsule Take 1 Capsule by mouth every day. For neuropathy pain 90 Capsule 3    budesonide-formoterol (SYMBICORT) 80-4.5 MCG/ACT Aerosol Inhale 2 Puffs 2 times a day. 30.6 g 3    atenolol (TENORMIN) 25 MG Tab Take 1 Tablet by mouth every day. For high blood pressure 90 Tablet 3    aspirin (ASA) 81 MG Chew Tab chewable tablet Chew 1 Tablet every day. 100 Tablet 3    clobetasol (TEMOVATE) 0.05 % Cream Apply in a thin film to affected skin once a day for plaque psoriasis. 60 g 11    tacrolimus (PROTOPIC) 0.1 % Ointment Apply a thin film to affected skin twice a day for 6-8 week for plaque psoriasis. 100 g 3    albuterol (PROVENTIL) 90 MCG/ACT Aero Soln Inhale 2 Puffs every 6 hours as needed for Shortness of Breath.       No current facility-administered medications for this visit.     He  has a past medical history of Anesthesia, Arthritis, Vallecillo esophagus, Breath shortness, COPD, Diabetes (12/30/09), Diabetic neuropathy (HCC), Elevated LFT's, GERD (gastroesophageal reflux disease), Heart burn, Hiatus hernia syndrome, Hyperlipidemia, Hypertension, Indigestion, Pain, Sleep apnea, and Unspecified disorder of thyroid.    ROS  No chest pain, no shortness of breath, no abdominal pain       Objective:     /70 (BP Location: Left arm, Patient Position: Sitting, BP Cuff Size: Adult long)   Pulse 84   Temp 36.1 °C (97 °F) (Temporal)   Resp 18   Ht 1.854 m (6' 1\")   Wt 107 kg (235 lb)   SpO2 94%  Body mass index is 31 kg/m².   Physical Exam:  Constitutional: Alert, no distress.  Skin: Warm, dry, good turgor, no rashes in visible areas.  Eye: Equal, round and reactive, conjunctiva clear, lids normal.  ENMT: Lips without lesions, good dentition, oropharynx clear.  Neck: Trachea midline, no masses, no thyromegaly. No cervical or supraclavicular lymphadenopathy  Respiratory: Unlabored respiratory effort, lungs clear to auscultation, no wheezes, no " omayra.  Cardiovascular: Normal S1, S2, no murmur, no edema.  Abdomen: Soft, non-tender, no masses, no hepatosplenomegaly.  Psych: Alert and oriented x3, normal affect and mood.    Assessment and Plan:   The following treatment plan was discussed    1. Uncontrolled type 2 diabetes mellitus with hyperglycemia (HCC)  - HEMOGLOBIN A1C; Future    2. Family history of colon cancer    3. Nicotine dependence, uncomplicated (Current Smoker)  - REFERRAL TO LUNG CANCER SCREENING PROGRAM; Future    Other orders  - amoxicillin-clavulanate (AUGMENTIN) 875-125 MG Tab; Take 1 Tablet by mouth 2 times a day for 5 days.  Dispense: 10 Tablet; Refill: 0  - metFORMIN (GLUCOPHAGE) 500 MG Tab; Take 2 Tablets by mouth 2 times a day with meals.  Dispense: 360 Tablet; Refill: 3      Followup: Return in about 3 months (around 1/31/2025) for Lab Review, Diabetes, Smoking Cessation.

## 2024-11-26 ENCOUNTER — HOSPITAL ENCOUNTER (OUTPATIENT)
Dept: RADIOLOGY | Facility: MEDICAL CENTER | Age: 69
End: 2024-11-26
Attending: FAMILY MEDICINE
Payer: MEDICARE

## 2024-11-26 DIAGNOSIS — F17.210 CIGARETTE SMOKER: ICD-10-CM

## 2024-11-26 PROCEDURE — 71271 CT THORAX LUNG CANCER SCR C-: CPT

## 2024-12-02 ENCOUNTER — TELEPHONE (OUTPATIENT)
Dept: SLEEP MEDICINE | Facility: MEDICAL CENTER | Age: 69
End: 2024-12-02
Payer: MEDICARE

## 2024-12-02 NOTE — TELEPHONE ENCOUNTER
Phoned patient with results of LDCT exam performed 11/26/24.  He did not answer. LVM requesting he check his Mychart for a message from me summarizing his results.  Provided my contact information in case he has questions/concerns.    Notified him that the results showed 4 lung nodules for which a 6 month follow-up CT is recommended.    We recommend a follow-up low-dose chest CT in 6 months to monitor for changes.  He is to request this imaging order from his PCP.    Patient updated regarding incidental findings of heavy coronary artery calcifications, moderate aortic valve calcifications, coarse lung markings and bronchial wall thickening, spine degenerative changes and will follow-up with his PCP.    His PCP, Dr. Mary Carvalho, was notified of results and incidental findings per this communication.  Health maintenance to be updated and patient will be sent lung cancer screening result letter.        CT-LUNG CANCER-SCREENING    Result Date: 11/27/2024 11/26/2024 12:11 PM HISTORY/REASON FOR EXAM:  Lung Cancer Screening smoking history. COMPARISON: None. TECHNIQUE/EXAM DESCRIPTION AND NUMBER OF VIEWS: Lung cancer screening without contrast. Low dose noncontrast helical images were obtained of the chest from the lung apices through the costophrenic sulci utilizing thin collimation and intervals with reconstructed images sent to PACS in axial, coronal and sagittal planes. Low dose optimization technique was utilized for this CT exam including automated exposure control and adjustment of the mA and/or kV according to patient size. FINDINGS: No mediastinal, hilar or axillary adenopathy. Heavy coronary artery calcification. Moderate aortic valve calcification. No aneurysm. No pericardial effusion. The visible portions of the upper abdomen show cholecystectomy clips. The lungs show scattered coarse lung markings and prominent bronchial wall thickening. No emphysema. No visible bronchiectasis. For tiny lung nodules are  noted including right upper lobe anteriorly image 62, right midlung laterally image 66, right lower lung laterally image 80 and left midlung laterally image 73. No bone lesion is evident. Spine degenerative changes.     Impression: 1. 4 tiny lung nodules. Six-month follow-up recommended. 2. Prominent bronchial wall thickening. Mild coarse lung markings. No bronchiectasis or emphysema. 3. Coronary artery calcification. Moderate aortic valve calcification. 4. Cholecystectomy clips. Lung RADS: 3 - Probably Benign: Probably benign finding(s) - short term followup suggested; includes nodules with a low likelihood of becoming a clinically active cancer Findings: solid nodule(s): greater than or equal to 6 to less than 8 mm at baseline OR new 4 mm to less than 6 mm part solid nodule(s): greater than or equal to 6 mm total diameter with solid component less than 6 mm OR new less than 6 mm total diameter non solid nodule(s) (GGN) greater than or equal to 30 mm on baseline CT or new Management: 6 month LDCT

## 2024-12-11 DIAGNOSIS — R91.8 LUNG NODULES: ICD-10-CM

## 2025-01-07 DIAGNOSIS — E11.65 UNCONTROLLED TYPE 2 DIABETES MELLITUS WITH HYPERGLYCEMIA (HCC): ICD-10-CM

## 2025-01-07 DIAGNOSIS — E78.5 HYPERLIPIDEMIA, UNSPECIFIED HYPERLIPIDEMIA TYPE: ICD-10-CM

## 2025-01-07 RX ORDER — ATENOLOL 25 MG/1
25 TABLET ORAL DAILY
Qty: 90 TABLET | Refills: 3 | Status: SHIPPED | OUTPATIENT
Start: 2025-01-07

## 2025-01-07 RX ORDER — HYDROCHLOROTHIAZIDE 25 MG/1
25 TABLET ORAL DAILY
Qty: 90 TABLET | Refills: 3 | Status: SHIPPED | OUTPATIENT
Start: 2025-01-07

## 2025-01-07 RX ORDER — ROSUVASTATIN CALCIUM 10 MG/1
10 TABLET, COATED ORAL EVERY EVENING
Qty: 90 TABLET | Refills: 3 | Status: SHIPPED | OUTPATIENT
Start: 2025-01-07

## 2025-01-07 RX ORDER — SEMAGLUTIDE 1.34 MG/ML
2 INJECTION, SOLUTION SUBCUTANEOUS
Qty: 18 ML | Refills: 3 | Status: SHIPPED | OUTPATIENT
Start: 2025-01-07

## 2025-01-07 RX ORDER — GLIMEPIRIDE 2 MG/1
2 TABLET ORAL EVERY MORNING
Qty: 90 TABLET | Refills: 3 | Status: SHIPPED | OUTPATIENT
Start: 2025-01-07

## 2025-01-07 RX ORDER — ASPIRIN 81 MG/1
81 TABLET, CHEWABLE ORAL DAILY
Qty: 100 TABLET | Refills: 3 | Status: SHIPPED | OUTPATIENT
Start: 2025-01-07

## 2025-01-07 RX ORDER — LEVOTHYROXINE SODIUM 125 UG/1
125 TABLET ORAL
Qty: 90 TABLET | Refills: 3 | Status: SHIPPED | OUTPATIENT
Start: 2025-01-07

## 2025-01-07 RX ORDER — DULOXETIN HYDROCHLORIDE 60 MG/1
60 CAPSULE, DELAYED RELEASE ORAL DAILY
Qty: 90 CAPSULE | Refills: 3 | Status: SHIPPED | OUTPATIENT
Start: 2025-01-07

## 2025-01-07 RX ORDER — FENOFIBRATE 145 MG/1
145 TABLET, COATED ORAL DAILY
Qty: 90 TABLET | Refills: 3 | Status: SHIPPED | OUTPATIENT
Start: 2025-01-07

## 2025-01-07 RX ORDER — TAMSULOSIN HYDROCHLORIDE 0.4 MG/1
0.4 CAPSULE ORAL
Qty: 90 CAPSULE | Refills: 3 | Status: SHIPPED | OUTPATIENT
Start: 2025-01-07

## 2025-01-07 RX ORDER — LOSARTAN POTASSIUM 25 MG/1
25 TABLET ORAL DAILY
Qty: 90 TABLET | Refills: 3 | Status: SHIPPED | OUTPATIENT
Start: 2025-01-07

## 2025-01-07 RX ORDER — BUDESONIDE AND FORMOTEROL FUMARATE DIHYDRATE 80; 4.5 UG/1; UG/1
2 AEROSOL RESPIRATORY (INHALATION) 2 TIMES DAILY
Qty: 30.6 G | Refills: 3 | Status: SHIPPED | OUTPATIENT
Start: 2025-01-07

## 2025-01-07 NOTE — TELEPHONE ENCOUNTER
Received request via: Patient    Was the patient seen in the last year in this department? Yes    Does the patient have an active prescription (recently filled or refills available) for medication(s) requested? No    [facility]    Does the patient have assisted Plus and need 100-day supply? (This applies to ALL medications) Patient does not have SCP

## 2025-02-12 ENCOUNTER — HOSPITAL ENCOUNTER (OUTPATIENT)
Dept: LAB | Facility: MEDICAL CENTER | Age: 70
End: 2025-02-12
Attending: FAMILY MEDICINE
Payer: MEDICARE

## 2025-02-12 DIAGNOSIS — E11.65 UNCONTROLLED TYPE 2 DIABETES MELLITUS WITH HYPERGLYCEMIA (HCC): ICD-10-CM

## 2025-02-12 DIAGNOSIS — R91.8 LUNG NODULES: ICD-10-CM

## 2025-02-12 LAB
ANION GAP SERPL CALC-SCNC: 11 MMOL/L (ref 7–16)
BUN SERPL-MCNC: 15 MG/DL (ref 8–22)
CALCIUM SERPL-MCNC: 9.8 MG/DL (ref 8.5–10.5)
CHLORIDE SERPL-SCNC: 94 MMOL/L (ref 96–112)
CO2 SERPL-SCNC: 26 MMOL/L (ref 20–33)
CREAT SERPL-MCNC: 1.1 MG/DL (ref 0.5–1.4)
EST. AVERAGE GLUCOSE BLD GHB EST-MCNC: 148 MG/DL
GFR SERPLBLD CREATININE-BSD FMLA CKD-EPI: 71 ML/MIN/1.73 M 2
GLUCOSE SERPL-MCNC: 113 MG/DL (ref 65–99)
HBA1C MFR BLD: 6.8 % (ref 4–5.6)
POTASSIUM SERPL-SCNC: 4.4 MMOL/L (ref 3.6–5.5)
SODIUM SERPL-SCNC: 131 MMOL/L (ref 135–145)

## 2025-02-12 PROCEDURE — 80048 BASIC METABOLIC PNL TOTAL CA: CPT

## 2025-02-12 PROCEDURE — 83036 HEMOGLOBIN GLYCOSYLATED A1C: CPT | Mod: GA

## 2025-02-12 PROCEDURE — 36415 COLL VENOUS BLD VENIPUNCTURE: CPT

## 2025-02-13 ENCOUNTER — RESULTS FOLLOW-UP (OUTPATIENT)
Dept: MEDICAL GROUP | Facility: PHYSICIAN GROUP | Age: 70
End: 2025-02-13

## 2025-02-19 ENCOUNTER — APPOINTMENT (OUTPATIENT)
Dept: MEDICAL GROUP | Facility: PHYSICIAN GROUP | Age: 70
End: 2025-02-19
Payer: MEDICARE

## 2025-02-19 VITALS
HEIGHT: 73 IN | BODY MASS INDEX: 31.28 KG/M2 | HEART RATE: 80 BPM | TEMPERATURE: 97.5 F | OXYGEN SATURATION: 96 % | SYSTOLIC BLOOD PRESSURE: 136 MMHG | DIASTOLIC BLOOD PRESSURE: 82 MMHG | WEIGHT: 236 LBS | RESPIRATION RATE: 19 BRPM

## 2025-02-19 DIAGNOSIS — E11.65 UNCONTROLLED TYPE 2 DIABETES MELLITUS WITH HYPERGLYCEMIA (HCC): ICD-10-CM

## 2025-02-19 DIAGNOSIS — Z12.83 SKIN CANCER SCREENING: ICD-10-CM

## 2025-02-19 DIAGNOSIS — E87.1 HYPONATREMIA: ICD-10-CM

## 2025-02-19 DIAGNOSIS — D23.4 DERMOID CYST OF SCALP: ICD-10-CM

## 2025-02-19 DIAGNOSIS — L57.0 MULTIPLE ACTINIC KERATOSES: ICD-10-CM

## 2025-02-19 DIAGNOSIS — Z85.828 HISTORY OF SKIN CANCER: ICD-10-CM

## 2025-02-19 PROBLEM — E11.40 DIABETIC NEUROPATHY (HCC): Status: RESOLVED | Noted: 2022-11-08 | Resolved: 2025-02-19

## 2025-02-19 PROCEDURE — 17000 DESTRUCT PREMALG LESION: CPT | Performed by: FAMILY MEDICINE

## 2025-02-19 PROCEDURE — 99214 OFFICE O/P EST MOD 30 MIN: CPT | Mod: 25 | Performed by: FAMILY MEDICINE

## 2025-02-19 PROCEDURE — 17003 DESTRUCT PREMALG LES 2-14: CPT | Performed by: FAMILY MEDICINE

## 2025-02-19 ASSESSMENT — PATIENT HEALTH QUESTIONNAIRE - PHQ9: CLINICAL INTERPRETATION OF PHQ2 SCORE: 0

## 2025-02-19 ASSESSMENT — FIBROSIS 4 INDEX: FIB4 SCORE: 2.4

## 2025-02-19 NOTE — ASSESSMENT & PLAN NOTE
6.8 A1c  69 GFR  32 LDL    On ozempic 2 mg every week, lantus 10 units qhs, metformin 1000 bid, glimepiride 2 mg, asa 81, rosuvastatin 10, fenofibrate, losartan 25    We discussed smoking cessation and I encouraged him to quit smoking, he is precontemplative.   Has no blood in urine or hemoptysis, unintentional weight loss, supraclavicular LAD or chest pain with exertion.     He is on symbicort and albuterol as needed for copd with respiratory symptoms stable.     Foot exam with no ulcers, dec sensation to monofilament testing b/l

## 2025-02-19 NOTE — ASSESSMENT & PLAN NOTE
Dry rough raised patches of skin on b/l ears consistent with ak  Hx of skin cancer of ear with resection in the past by dermatology    Skin cleaned with alcohol wipes, cryotherapy done with pulse application till ice ball persisted x 2 for 4 lesions.     Ref to dermatology provide for skin cancer screening.

## 2025-02-19 NOTE — PROGRESS NOTES
Subjective:   Bryan Onofre is a 69 y.o. male here today for evaluation and management of:     Uncontrolled type 2 diabetes mellitus with hyperglycemia (HCC)  6.8 A1c  69 GFR  32 LDL    On ozempic 2 mg every week, lantus 10 units qhs, metformin 1000 bid, glimepiride 2 mg, asa 81, rosuvastatin 10, fenofibrate, losartan 25    We discussed smoking cessation and I encouraged him to quit smoking, he is precontemplative.   Has no blood in urine or hemoptysis, unintentional weight loss, supraclavicular LAD or chest pain with exertion.     He is on symbicort and albuterol as needed for copd with respiratory symptoms stable.     Foot exam with no ulcers, dec sensation to monofilament testing b/l    Multiple actinic keratoses  Dry rough raised patches of skin on b/l ears consistent with ak  Hx of skin cancer of ear with resection in the past by dermatology    Skin cleaned with alcohol wipes, cryotherapy done with pulse application till ice ball persisted x 2 for 4 lesions.     Ref to dermatology provide for skin cancer screening.          Current medicines (including changes today)  Current Outpatient Medications   Medication Sig Dispense Refill    tamsulosin (FLOMAX) 0.4 MG capsule Take 1 Capsule by mouth 1/2 hour after dinner. For prostate 90 Capsule 3    rosuvastatin (CRESTOR) 10 MG Tab Take 1 Tablet by mouth every evening. 90 Tablet 3    omeprazole (PRILOSEC) 20 MG delayed-release capsule Take 2 Capsules by mouth every day. For Vallecillo's esophagus 180 Capsule 3    losartan (COZAAR) 25 MG Tab Take 1 Tablet by mouth every day. To protect kidneys 90 Tablet 3    levothyroxine (SYNTHROID) 125 MCG Tab Take 1 Tablet by mouth every morning on an empty stomach. 90 Tablet 3    hydroCHLOROthiazide 25 MG Tab Take 1 Tablet by mouth every day. For high blood pressure 90 Tablet 3    fenofibrate (TRICOR) 145 MG Tab Take 1 Tablet by mouth every day. For high triglycerides 90 Tablet 3    DULoxetine (CYMBALTA) 60 MG Cap DR Particles  "delayed-release capsule Take 1 Capsule by mouth every day. For neuropathy pain 90 Capsule 3    budesonide-formoterol (SYMBICORT) 80-4.5 MCG/ACT Aerosol Inhale 2 Puffs 2 times a day. 30.6 g 3    atenolol (TENORMIN) 25 MG Tab Take 1 Tablet by mouth every day. For high blood pressure 90 Tablet 3    aspirin (ASA) 81 MG Chew Tab chewable tablet Chew 1 Tablet every day. 100 Tablet 3    Semaglutide, 1 MG/DOSE, (OZEMPIC, 1 MG/DOSE,) 4 MG/3ML Solution Pen-injector Inject 2 mg under the skin every 7 days. For uncontrolled DM2, A1c >9 18 mL 3    glimepiride (AMARYL) 2 MG Tab Take 1 Tablet by mouth every morning. For uncontrolled diabetes. Stop if blood sugars drop below 90. 90 Tablet 3    metFORMIN (GLUCOPHAGE) 500 MG Tab Take 2 Tablets by mouth 2 times a day with meals. 360 Tablet 3    insulin glargine (LANTUS SOLOSTAR) 100 UNIT/ML Solution Pen-injector injection Inject 10 Units under the skin every evening. 9 mL 3    clobetasol (TEMOVATE) 0.05 % Cream Apply in a thin film to affected skin once a day for plaque psoriasis. 60 g 11    tacrolimus (PROTOPIC) 0.1 % Ointment Apply a thin film to affected skin twice a day for 6-8 week for plaque psoriasis. 100 g 3    albuterol (PROVENTIL) 90 MCG/ACT Aero Soln Inhale 2 Puffs every 6 hours as needed for Shortness of Breath.       No current facility-administered medications for this visit.     He  has a past medical history of Anesthesia, Arthritis, Vallecillo esophagus, Breath shortness, COPD, Diabetes (12/30/09), Diabetic neuropathy (HCC), Elevated LFT's, GERD (gastroesophageal reflux disease), Heart burn, Hiatus hernia syndrome, Hyperlipidemia, Hypertension, Indigestion, Pain, Sleep apnea, and Unspecified disorder of thyroid.    ROS  No chest pain, no shortness of breath, no abdominal pain       Objective:     /82 (BP Location: Right arm, Patient Position: Sitting, BP Cuff Size: Adult)   Pulse 80   Temp 36.4 °C (97.5 °F) (Temporal)   Resp 19   Ht 1.854 m (6' 1\")   Wt 107 kg " (236 lb)   SpO2 96%  Body mass index is 31.14 kg/m².   Physical Exam:  Constitutional: Alert, no distress.  Skin: Warm, dry, good turgor, no rashes in visible areas. Small mobile 0.5 cm round cyst behind right ear.     Eye: Equal, round and reactive, conjunctiva clear, lids normal.  ENMT: Lips without lesions, good dentition, oropharynx clear.  Neck: Trachea midline, no masses, no thyromegaly. No cervical or supraclavicular lymphadenopathy  Respiratory: Unlabored respiratory effort, lungs clear to auscultation, no wheezes, no ronchi.  Cardiovascular: Normal S1, S2, no murmur, no edema.  Abdomen: Soft, non-tender, no masses, no hepatosplenomegaly.  Psych: Alert and oriented x3, normal affect and mood.    Assessment and Plan:   The following treatment plan was discussed    1. Diabetic neuropathy (HCC)  - Diabetic Monofilament LE Exam    2. Uncontrolled type 2 diabetes mellitus with hyperglycemia (HCC)  - HEMOGLOBIN A1C; Future  - Basic Metabolic Panel; Future    3. Hyponatremia  - Basic Metabolic Panel; Future    4. Skin cancer screening  - Referral to Dermatology    5. History of skin cancer  - Referral to Dermatology    6. Dermoid cyst of scalp  - Referral to Dermatology    7. Multiple actinic keratoses      Followup: Return in about 6 months (around 8/19/2025) for Diabetes.

## 2025-02-20 NOTE — Clinical Note
REFERRAL APPROVAL NOTICE         Sent on February 20, 2025                   Bryan Onofre  [address]  [place] [address]                   Dear Mr. Onofre,    After a careful review of the medical information and benefit coverage, [facility] has processed your referral. See below for additional details.    If applicable, you must be actively enrolled with your insurance for coverage of the authorized service. If you have any questions regarding your coverage, please contact your insurance directly.    REFERRAL INFORMATION   Referral #:  [id number]  Referred-To Service Location    Referred-By Provider:  Dermatology    Mary Carvalho M.D.   [facility]      [facility] [place] [address]  [phone] [address] [address]  [phone]    Referral Start Date:  02/19/2025  Referral End Date:   02/19/2026             SCHEDULING  If you do not already have an appointment, please call [phone] to make an appointment.     MORE INFORMATION  If you do not already have a [employer] account, sign up at: [email or web address]  You can access your medical information, make appointments, see lab results, billing information, and more.  If you have questions regarding this referral, please contact  the [facility] Referrals department at:             [phone]. Monday - Friday 8:00AM - 5:00PM.     Sincerely,    [facility]

## 2025-06-11 ENCOUNTER — HOSPITAL ENCOUNTER (OUTPATIENT)
Dept: RADIOLOGY | Facility: MEDICAL CENTER | Age: 70
End: 2025-06-11
Attending: FAMILY MEDICINE
Payer: MEDICARE

## 2025-06-11 DIAGNOSIS — R91.8 LUNG NODULES: ICD-10-CM

## 2025-06-11 PROCEDURE — 71250 CT THORAX DX C-: CPT

## 2025-06-22 DIAGNOSIS — E11.65 UNCONTROLLED TYPE 2 DIABETES MELLITUS WITH HYPERGLYCEMIA (HCC): ICD-10-CM

## 2025-06-24 RX ORDER — INSULIN GLARGINE 100 [IU]/ML
10 INJECTION, SOLUTION SUBCUTANEOUS EVERY EVENING
Qty: 9 ML | Refills: 3 | Status: SHIPPED | OUTPATIENT
Start: 2025-06-24

## 2025-06-24 RX ORDER — SEMAGLUTIDE 1.34 MG/ML
2 INJECTION, SOLUTION SUBCUTANEOUS
Qty: 18 ML | Refills: 3 | Status: SHIPPED | OUTPATIENT
Start: 2025-06-24

## 2025-06-24 NOTE — TELEPHONE ENCOUNTER
Received request via: Patient    Was the patient seen in the last year in this department? Yes    Does the patient have an active prescription (recently filled or refills available) for medication(s) requested? No    Pharmacy Name: [facility]    Does the patient have [facility] Plus and need 100-day supply? (This applies to ALL medications) Patient does not have SCP

## 2025-08-15 ENCOUNTER — HOSPITAL ENCOUNTER (OUTPATIENT)
Dept: LAB | Facility: MEDICAL CENTER | Age: 70
End: 2025-08-15
Attending: FAMILY MEDICINE
Payer: MEDICARE

## 2025-08-15 DIAGNOSIS — E87.1 HYPONATREMIA: ICD-10-CM

## 2025-08-15 DIAGNOSIS — E11.65 UNCONTROLLED TYPE 2 DIABETES MELLITUS WITH HYPERGLYCEMIA (HCC): ICD-10-CM

## 2025-08-15 LAB
ANION GAP SERPL CALC-SCNC: 12 MMOL/L (ref 7–16)
BUN SERPL-MCNC: 18 MG/DL (ref 8–22)
CALCIUM SERPL-MCNC: 10 MG/DL (ref 8.5–10.5)
CHLORIDE SERPL-SCNC: 99 MMOL/L (ref 96–112)
CO2 SERPL-SCNC: 23 MMOL/L (ref 20–33)
CREAT SERPL-MCNC: 1.07 MG/DL (ref 0.5–1.4)
EST. AVERAGE GLUCOSE BLD GHB EST-MCNC: 166 MG/DL
GFR SERPLBLD CREATININE-BSD FMLA CKD-EPI: 74 ML/MIN/1.73 M 2
GLUCOSE SERPL-MCNC: 202 MG/DL (ref 65–99)
HBA1C MFR BLD: 7.4 % (ref 4–5.6)
POTASSIUM SERPL-SCNC: 4.4 MMOL/L (ref 3.6–5.5)
SODIUM SERPL-SCNC: 134 MMOL/L (ref 135–145)

## 2025-08-15 PROCEDURE — 80048 BASIC METABOLIC PNL TOTAL CA: CPT

## 2025-08-15 PROCEDURE — 36415 COLL VENOUS BLD VENIPUNCTURE: CPT

## 2025-08-15 PROCEDURE — 83036 HEMOGLOBIN GLYCOSYLATED A1C: CPT | Mod: GA

## 2025-08-22 ENCOUNTER — OFFICE VISIT (OUTPATIENT)
Dept: MEDICAL GROUP | Facility: PHYSICIAN GROUP | Age: 70
End: 2025-08-22
Payer: MEDICARE

## 2025-08-22 VITALS
SYSTOLIC BLOOD PRESSURE: 138 MMHG | HEIGHT: 72 IN | DIASTOLIC BLOOD PRESSURE: 82 MMHG | HEART RATE: 76 BPM | WEIGHT: 233 LBS | RESPIRATION RATE: 16 BRPM | OXYGEN SATURATION: 17 % | BODY MASS INDEX: 31.56 KG/M2 | TEMPERATURE: 97.2 F

## 2025-08-22 DIAGNOSIS — L57.0 MULTIPLE ACTINIC KERATOSES: ICD-10-CM

## 2025-08-22 DIAGNOSIS — Z12.83 SKIN CANCER SCREENING: ICD-10-CM

## 2025-08-22 DIAGNOSIS — E11.65 UNCONTROLLED TYPE 2 DIABETES MELLITUS WITH HYPERGLYCEMIA (HCC): Primary | ICD-10-CM

## 2025-08-22 DIAGNOSIS — Z85.828 HISTORY OF SKIN CANCER: ICD-10-CM

## 2025-08-22 PROCEDURE — 3075F SYST BP GE 130 - 139MM HG: CPT | Performed by: FAMILY MEDICINE

## 2025-08-22 PROCEDURE — 99214 OFFICE O/P EST MOD 30 MIN: CPT | Performed by: FAMILY MEDICINE

## 2025-08-22 PROCEDURE — 3079F DIAST BP 80-89 MM HG: CPT | Performed by: FAMILY MEDICINE

## 2025-08-22 RX ORDER — MECLIZINE HYDROCHLORIDE 25 MG/1
25 TABLET ORAL 3 TIMES DAILY PRN
Qty: 30 TABLET | Refills: 0 | Status: SHIPPED | OUTPATIENT
Start: 2025-08-22

## 2025-08-22 ASSESSMENT — FIBROSIS 4 INDEX: FIB4 SCORE: 2.43

## 2025-12-23 ENCOUNTER — APPOINTMENT (OUTPATIENT)
Dept: MEDICAL GROUP | Facility: PHYSICIAN GROUP | Age: 70
End: 2025-12-23